# Patient Record
Sex: FEMALE | Race: WHITE | Employment: FULL TIME | URBAN - METROPOLITAN AREA
[De-identification: names, ages, dates, MRNs, and addresses within clinical notes are randomized per-mention and may not be internally consistent; named-entity substitution may affect disease eponyms.]

---

## 2017-04-07 LAB
HBA1C MFR BLD: 5.4 %
TESTOSTERONE TOTAL: NORMAL
VITAMIN B-12: NORMAL

## 2017-04-26 ENCOUNTER — OFFICE VISIT (OUTPATIENT)
Dept: PRIMARY CARE CLINIC | Age: 47
End: 2017-04-26

## 2017-04-26 VITALS
HEART RATE: 74 BPM | BODY MASS INDEX: 40.02 KG/M2 | WEIGHT: 212 LBS | OXYGEN SATURATION: 98 % | SYSTOLIC BLOOD PRESSURE: 128 MMHG | RESPIRATION RATE: 16 BRPM | TEMPERATURE: 97.5 F | HEIGHT: 61 IN | DIASTOLIC BLOOD PRESSURE: 80 MMHG

## 2017-04-26 DIAGNOSIS — R22.1 NECK SWELLING: ICD-10-CM

## 2017-04-26 DIAGNOSIS — I10 ESSENTIAL HYPERTENSION: Primary | ICD-10-CM

## 2017-04-26 DIAGNOSIS — E28.2 PCOS (POLYCYSTIC OVARIAN SYNDROME): ICD-10-CM

## 2017-04-26 PROCEDURE — 1036F TOBACCO NON-USER: CPT | Performed by: FAMILY MEDICINE

## 2017-04-26 PROCEDURE — G8417 CALC BMI ABV UP PARAM F/U: HCPCS | Performed by: FAMILY MEDICINE

## 2017-04-26 PROCEDURE — 99214 OFFICE O/P EST MOD 30 MIN: CPT | Performed by: FAMILY MEDICINE

## 2017-04-26 PROCEDURE — G8427 DOCREV CUR MEDS BY ELIG CLIN: HCPCS | Performed by: FAMILY MEDICINE

## 2017-04-26 RX ORDER — CHOLECALCIFEROL (VITAMIN D3) 25 MCG
TABLET,CHEWABLE ORAL
COMMUNITY

## 2017-04-26 RX ORDER — LANOLIN ALCOHOL/MO/W.PET/CERES
400 CREAM (GRAM) TOPICAL DAILY
COMMUNITY
End: 2019-01-14

## 2017-04-26 ASSESSMENT — ENCOUNTER SYMPTOMS
EYE REDNESS: 0
EYE PAIN: 0
ORTHOPNEA: 0
CONSTIPATION: 0
NAUSEA: 0
WHEEZING: 0
CHEST TIGHTNESS: 0
DIARRHEA: 0
PHOTOPHOBIA: 0
EYE DISCHARGE: 0
COLOR CHANGE: 0
ABDOMINAL PAIN: 0
BLURRED VISION: 0
CHOKING: 0
STRIDOR: 0
APNEA: 0
SHORTNESS OF BREATH: 0
FACIAL SWELLING: 0

## 2017-05-30 ENCOUNTER — TELEPHONE (OUTPATIENT)
Dept: PRIMARY CARE CLINIC | Age: 47
End: 2017-05-30

## 2017-06-02 ENCOUNTER — OFFICE VISIT (OUTPATIENT)
Dept: PRIMARY CARE CLINIC | Age: 47
End: 2017-06-02

## 2017-06-02 VITALS
HEIGHT: 61 IN | RESPIRATION RATE: 14 BRPM | SYSTOLIC BLOOD PRESSURE: 138 MMHG | DIASTOLIC BLOOD PRESSURE: 88 MMHG | BODY MASS INDEX: 40.22 KG/M2 | HEART RATE: 72 BPM | WEIGHT: 213 LBS | TEMPERATURE: 98.4 F

## 2017-06-02 DIAGNOSIS — E11.9 TYPE 2 DIABETES MELLITUS WITHOUT COMPLICATION, WITHOUT LONG-TERM CURRENT USE OF INSULIN (HCC): ICD-10-CM

## 2017-06-02 DIAGNOSIS — R22.1 NECK SWELLING: Primary | ICD-10-CM

## 2017-06-02 DIAGNOSIS — R22.1 NECK SWELLING: ICD-10-CM

## 2017-06-02 DIAGNOSIS — E28.2 PCOS (POLYCYSTIC OVARIAN SYNDROME): ICD-10-CM

## 2017-06-02 LAB
CREATININE URINE: 24.7 MG/DL
MICROALBUMIN UR-MCNC: 1.3 MG/DL
MICROALBUMIN/CREAT UR-RTO: 52.6 MG/G (ref 0–30)
T4 FREE: 1.13 NG/DL (ref 0.93–1.7)
T4 TOTAL: 6.9 UG/DL (ref 4.5–11.7)
TSH SERPL DL<=0.05 MIU/L-ACNC: 1.45 UIU/ML (ref 0.27–4.2)

## 2017-06-02 PROCEDURE — G8427 DOCREV CUR MEDS BY ELIG CLIN: HCPCS | Performed by: FAMILY MEDICINE

## 2017-06-02 PROCEDURE — 1036F TOBACCO NON-USER: CPT | Performed by: FAMILY MEDICINE

## 2017-06-02 PROCEDURE — 3046F HEMOGLOBIN A1C LEVEL >9.0%: CPT | Performed by: FAMILY MEDICINE

## 2017-06-02 PROCEDURE — 99213 OFFICE O/P EST LOW 20 MIN: CPT | Performed by: FAMILY MEDICINE

## 2017-06-02 PROCEDURE — G8417 CALC BMI ABV UP PARAM F/U: HCPCS | Performed by: FAMILY MEDICINE

## 2017-06-02 ASSESSMENT — ENCOUNTER SYMPTOMS
STRIDOR: 0
PHOTOPHOBIA: 0
FACIAL SWELLING: 0
DIARRHEA: 0
ABDOMINAL PAIN: 0
EYE REDNESS: 0
NAUSEA: 0
EYE DISCHARGE: 0
COLOR CHANGE: 0
CONSTIPATION: 0
WHEEZING: 0
CHOKING: 0
CHEST TIGHTNESS: 0
EYE PAIN: 0
APNEA: 0

## 2017-06-02 ASSESSMENT — PATIENT HEALTH QUESTIONNAIRE - PHQ9
SUM OF ALL RESPONSES TO PHQ QUESTIONS 1-9: 0
2. FEELING DOWN, DEPRESSED OR HOPELESS: 0
SUM OF ALL RESPONSES TO PHQ9 QUESTIONS 1 & 2: 0
1. LITTLE INTEREST OR PLEASURE IN DOING THINGS: 0

## 2017-06-24 DIAGNOSIS — J02.9 SORE THROAT: ICD-10-CM

## 2017-06-27 LAB — THROAT CULTURE: NORMAL

## 2017-09-28 RX ORDER — LISINOPRIL 20 MG/1
TABLET ORAL
Qty: 90 TABLET | Refills: 3 | Status: SHIPPED | OUTPATIENT
Start: 2017-09-28 | End: 2019-01-14 | Stop reason: SDUPTHER

## 2017-09-28 RX ORDER — AMLODIPINE BESYLATE 10 MG/1
TABLET ORAL
Qty: 90 TABLET | Refills: 3 | Status: SHIPPED | OUTPATIENT
Start: 2017-09-28 | End: 2019-01-14 | Stop reason: SDUPTHER

## 2017-12-07 ENCOUNTER — OFFICE VISIT (OUTPATIENT)
Dept: PRIMARY CARE CLINIC | Age: 47
End: 2017-12-07

## 2017-12-07 VITALS
TEMPERATURE: 98.3 F | SYSTOLIC BLOOD PRESSURE: 144 MMHG | RESPIRATION RATE: 14 BRPM | HEIGHT: 61 IN | BODY MASS INDEX: 41.72 KG/M2 | DIASTOLIC BLOOD PRESSURE: 88 MMHG | HEART RATE: 76 BPM | WEIGHT: 221 LBS

## 2017-12-07 DIAGNOSIS — R39.9 UTI SYMPTOMS: Primary | ICD-10-CM

## 2017-12-07 DIAGNOSIS — B35.4 TINEA CORPORIS: ICD-10-CM

## 2017-12-07 DIAGNOSIS — R10.9 BILATERAL FLANK PAIN: ICD-10-CM

## 2017-12-07 LAB
BILIRUBIN, POC: NORMAL
BLOOD URINE, POC: NORMAL
CLARITY, POC: CLEAR
COLOR, POC: NORMAL
GLUCOSE URINE, POC: NORMAL
KETONES, POC: NORMAL
LEUKOCYTE EST, POC: NORMAL
NITRITE, POC: NORMAL
PH, POC: 6
PROTEIN, POC: NORMAL
SPECIFIC GRAVITY, POC: 1.01
UROBILINOGEN, POC: 3.5

## 2017-12-07 PROCEDURE — G8417 CALC BMI ABV UP PARAM F/U: HCPCS | Performed by: FAMILY MEDICINE

## 2017-12-07 PROCEDURE — 81002 URINALYSIS NONAUTO W/O SCOPE: CPT | Performed by: FAMILY MEDICINE

## 2017-12-07 PROCEDURE — 99214 OFFICE O/P EST MOD 30 MIN: CPT | Performed by: FAMILY MEDICINE

## 2017-12-07 PROCEDURE — G8427 DOCREV CUR MEDS BY ELIG CLIN: HCPCS | Performed by: FAMILY MEDICINE

## 2017-12-07 PROCEDURE — 1036F TOBACCO NON-USER: CPT | Performed by: FAMILY MEDICINE

## 2017-12-07 PROCEDURE — 90715 TDAP VACCINE 7 YRS/> IM: CPT | Performed by: FAMILY MEDICINE

## 2017-12-07 PROCEDURE — 90471 IMMUNIZATION ADMIN: CPT | Performed by: FAMILY MEDICINE

## 2017-12-07 PROCEDURE — G8484 FLU IMMUNIZE NO ADMIN: HCPCS | Performed by: FAMILY MEDICINE

## 2017-12-07 RX ORDER — PREDNISONE 10 MG/1
TABLET ORAL
Qty: 20 TABLET | Refills: 0 | Status: SHIPPED | OUTPATIENT
Start: 2017-12-07 | End: 2017-12-17

## 2017-12-07 RX ORDER — METHOCARBAMOL 500 MG/1
500 TABLET, FILM COATED ORAL EVERY EVENING
Qty: 10 TABLET | Refills: 0 | Status: SHIPPED | OUTPATIENT
Start: 2017-12-07 | End: 2017-12-17

## 2017-12-07 ASSESSMENT — ENCOUNTER SYMPTOMS
EYES NEGATIVE: 1
DIARRHEA: 0
PHOTOPHOBIA: 0
EYE REDNESS: 0
RESPIRATORY NEGATIVE: 1
ABDOMINAL PAIN: 0
CONSTIPATION: 0
SHORTNESS OF BREATH: 0
GASTROINTESTINAL NEGATIVE: 1
EYE ITCHING: 0
BOWEL INCONTINENCE: 0
BACK PAIN: 1
WHEEZING: 0

## 2017-12-07 NOTE — PROGRESS NOTES
on file. Social History     Social History    Marital status:      Spouse name: N/A    Number of children: N/A    Years of education: N/A     Occupational History    Not on file. Social History Main Topics    Smoking status: Former Smoker     Packs/day: 0.80     Types: Cigarettes     Quit date: 1/5/2013    Smokeless tobacco: Never Used    Alcohol use No    Drug use: No    Sexual activity: Not on file     Other Topics Concern    Not on file     Social History Narrative    No narrative on file     Allergies:  Review of patient's allergies indicates no known allergies. Review of Systems   Constitutional: Negative. Negative for activity change, appetite change, fatigue, fever and weight loss. HENT: Negative. Eyes: Negative. Negative for photophobia, redness and itching. Respiratory: Negative. Negative for shortness of breath and wheezing. Cardiovascular: Negative. Negative for chest pain. Gastrointestinal: Negative. Negative for abdominal pain, bowel incontinence, constipation and diarrhea. Genitourinary: Positive for flank pain. Negative for bladder incontinence, decreased urine volume, difficulty urinating, dyspareunia, dysuria, enuresis, frequency, hematuria, pelvic pain and urgency. Musculoskeletal: Positive for back pain. Skin: Positive for rash (left axilla). Neurological: Negative. Negative for tingling, weakness, numbness, headaches and paresthesias. Psychiatric/Behavioral: Negative. Negative for agitation and behavioral problems. The patient is not nervous/anxious. Objective:   BP (!) 144/88 (Site: Right Arm, Position: Sitting, Cuff Size: Medium Adult)   Pulse 76   Temp 98.3 °F (36.8 °C) (Oral)   Resp 14   Ht 5' 1\" (1.549 m)   Wt 221 lb (100.2 kg)   LMP 11/14/2017 (Exact Date)   BMI 41.76 kg/m²     Physical Exam   Constitutional: She is oriented to person, place, and time. She appears well-developed and well-nourished.    HENT:   Head: Dispense:  10 tablet     Refill:  0    econazole nitrate 1 % cream     Sig: Apply topically 2 times daily Apply topically daily. Dispense:  85 g     Refill:  3       Controlled Substances Monitoring:      No Follow-up on file. I, Yolande Mark CMA   , am scribing for and in the presence of [employer], DO. Electronically signed by :  Yolande Gao Chi, DO, personally performed the services described in this documentation, as scribed by Tomasa Henderson CMA   in my presence, and it is both accurate and complete.  Electronically signed by: [place] RoomActuallyDO    12/7/17 3:33 PM    Pilo Ruiz DO

## 2017-12-26 ENCOUNTER — OFFICE VISIT (OUTPATIENT)
Dept: PRIMARY CARE CLINIC | Age: 47
End: 2017-12-26

## 2017-12-26 VITALS
TEMPERATURE: 98.5 F | WEIGHT: 223 LBS | DIASTOLIC BLOOD PRESSURE: 76 MMHG | BODY MASS INDEX: 42.1 KG/M2 | HEART RATE: 88 BPM | RESPIRATION RATE: 14 BRPM | HEIGHT: 61 IN | SYSTOLIC BLOOD PRESSURE: 134 MMHG

## 2017-12-26 DIAGNOSIS — N28.1 RENAL CYST: Primary | ICD-10-CM

## 2017-12-26 PROCEDURE — 1036F TOBACCO NON-USER: CPT | Performed by: FAMILY MEDICINE

## 2017-12-26 PROCEDURE — 99213 OFFICE O/P EST LOW 20 MIN: CPT | Performed by: FAMILY MEDICINE

## 2017-12-26 PROCEDURE — G8427 DOCREV CUR MEDS BY ELIG CLIN: HCPCS | Performed by: FAMILY MEDICINE

## 2017-12-26 PROCEDURE — G8417 CALC BMI ABV UP PARAM F/U: HCPCS | Performed by: FAMILY MEDICINE

## 2017-12-26 PROCEDURE — G8484 FLU IMMUNIZE NO ADMIN: HCPCS | Performed by: FAMILY MEDICINE

## 2017-12-26 ASSESSMENT — ENCOUNTER SYMPTOMS
FACIAL SWELLING: 0
PHOTOPHOBIA: 0
EYE DISCHARGE: 0
CHEST TIGHTNESS: 0
EYE REDNESS: 0
DIARRHEA: 0
WHEEZING: 0
CHOKING: 0
VOMITING: 0
EYE PAIN: 0
CONSTIPATION: 0
APNEA: 0
ABDOMINAL PAIN: 0
COLOR CHANGE: 0
STRIDOR: 0
NAUSEA: 0

## 2017-12-26 NOTE — PROGRESS NOTES
Subjective:      Patient ID: Iza Dominguez is a 50 y.o. female who presents today for:  Chief Complaint   Patient presents with    Results     Pt is here for a review of sonagram results 2017 for urinary problems. Pt states the UTI symptoms are resolved and feeling good. Urinary Tract Infection    This is a new problem. The current episode started 1 to 4 weeks ago. The problem has been resolved. The pain is at a severity of 0/10. The patient is experiencing no pain. There has been no fever. There is no history of pyelonephritis. Pertinent negatives include no chills, discharge, flank pain, frequency, hematuria, hesitancy, nausea, possible pregnancy, sweats, urgency or vomiting. Treatments tried: Prednisone, muscle relaxers. The treatment provided significant relief. There is no history of catheterization, kidney stones, recurrent UTIs, a single kidney, urinary stasis or a urological procedure. Results of Retroperitoneal US were discussed. Past Medical History:   Diagnosis Date    Basal cell carcinoma, Mohs, Jakob, Nose 2013    Bronchitis 2013    DM (diabetes mellitus) ([facility] [address] 2013    ETD (eustachian tube dysfunction) 2015    Fatigue 2013    Fatty liver     [place] nodes 2013    Hyperlipidemia     Hypertension     OA (osteoarthritis) 2013    Obesity 2013    PCOS (polycystic ovarian syndrome) 3/2/2015    RAD (reactive airway disease) 2013    Renal cyst 2017    Tinea corporis 2016    Tobacco abuse, quit 2013    Unspecified sleep apnea     Vitamin D deficiency 3/2/2015     Past Surgical History:   Procedure Laterality Date     SECTION      x 2    TONSILLECTOMY       No family history on file. Social History     Social History    Marital status:      Spouse name: N/A    Number of children: N/A    Years of education: N/A     Occupational History    Not on file.      Social History Main Topics    Smoking status: Former Smoker     Packs/day: 0.80     Types: Cigarettes     Quit date: 1/5/2013    Smokeless tobacco: Never Used    Alcohol use No    Drug use: No    Sexual activity: Not on file     Other Topics Concern    Not on file     Social History Narrative    No narrative on file     Allergies:  Review of patient's allergies indicates no known allergies. Review of Systems   Constitutional: Negative for activity change, appetite change, chills, diaphoresis and fever. HENT: Negative for congestion, ear discharge, ear pain, facial swelling, hearing loss and mouth sores. Eyes: Negative for photophobia, pain, discharge and redness. Respiratory: Negative for apnea, choking, chest tightness, wheezing and stridor. Cardiovascular: Negative for chest pain, palpitations and leg swelling. Gastrointestinal: Negative for abdominal pain, constipation, diarrhea, nausea and vomiting. Endocrine: Negative for cold intolerance, heat intolerance, polydipsia and polyphagia. Genitourinary: Negative for dysuria, flank pain, frequency, hematuria, hesitancy, pelvic pain and urgency. Musculoskeletal: Negative for gait problem, joint swelling, neck pain and neck stiffness. Skin: Negative for color change, pallor, rash and wound. Allergic/Immunologic: Negative for immunocompromised state. Neurological: Negative for tremors, syncope, facial asymmetry, speech difficulty and headaches. Psychiatric/Behavioral: Negative for confusion and hallucinations. The patient is not nervous/anxious and is not hyperactive. Objective:   /76 (Site: Left Arm, Position: Sitting, Cuff Size: Large Adult)   Pulse 88   Temp 98.5 °F (36.9 °C) (Oral)   Resp 14   Ht 5' 1\" (1.549 m)   Wt 223 lb (101.2 kg)   LMP 12/06/2017 (Approximate)   BMI 42.14 kg/m²     Physical Exam   Constitutional: She is oriented to person, place, and time. She appears well-developed and well-nourished.    HENT:   Head: Normocephalic and

## 2018-03-15 DIAGNOSIS — R10.9 BILATERAL FLANK PAIN: ICD-10-CM

## 2018-09-23 RX ORDER — AMLODIPINE BESYLATE 10 MG/1
TABLET ORAL
Qty: 90 TABLET | Refills: 3 | OUTPATIENT
Start: 2018-09-23

## 2018-09-23 RX ORDER — LISINOPRIL 20 MG/1
TABLET ORAL
Qty: 90 TABLET | Refills: 3 | OUTPATIENT
Start: 2018-09-23

## 2018-12-05 ENCOUNTER — TELEPHONE (OUTPATIENT)
Dept: PRIMARY CARE CLINIC | Age: 48
End: 2018-12-05

## 2019-01-08 ENCOUNTER — OFFICE VISIT (OUTPATIENT)
Dept: PRIMARY CARE CLINIC | Age: 49
End: 2019-01-08
Payer: COMMERCIAL

## 2019-01-08 VITALS
OXYGEN SATURATION: 98 % | DIASTOLIC BLOOD PRESSURE: 82 MMHG | TEMPERATURE: 97.6 F | SYSTOLIC BLOOD PRESSURE: 120 MMHG | WEIGHT: 223 LBS | HEIGHT: 61 IN | BODY MASS INDEX: 42.1 KG/M2 | RESPIRATION RATE: 16 BRPM | HEART RATE: 76 BPM

## 2019-01-08 DIAGNOSIS — J40 BRONCHITIS: Primary | ICD-10-CM

## 2019-01-08 DIAGNOSIS — R05.9 COUGH: ICD-10-CM

## 2019-01-08 PROCEDURE — G8427 DOCREV CUR MEDS BY ELIG CLIN: HCPCS | Performed by: NURSE PRACTITIONER

## 2019-01-08 PROCEDURE — 3017F COLORECTAL CA SCREEN DOC REV: CPT | Performed by: NURSE PRACTITIONER

## 2019-01-08 PROCEDURE — G8484 FLU IMMUNIZE NO ADMIN: HCPCS | Performed by: NURSE PRACTITIONER

## 2019-01-08 PROCEDURE — 99213 OFFICE O/P EST LOW 20 MIN: CPT | Performed by: NURSE PRACTITIONER

## 2019-01-08 PROCEDURE — G8417 CALC BMI ABV UP PARAM F/U: HCPCS | Performed by: NURSE PRACTITIONER

## 2019-01-08 PROCEDURE — 1036F TOBACCO NON-USER: CPT | Performed by: NURSE PRACTITIONER

## 2019-01-08 RX ORDER — PREDNISONE 10 MG/1
TABLET ORAL
Qty: 18 TABLET | Refills: 0 | Status: SHIPPED | OUTPATIENT
Start: 2019-01-08 | End: 2019-01-17

## 2019-01-08 RX ORDER — AZITHROMYCIN 250 MG/1
TABLET, FILM COATED ORAL
Qty: 1 PACKET | Refills: 0 | Status: SHIPPED | OUTPATIENT
Start: 2019-01-08 | End: 2019-01-14

## 2019-01-08 ASSESSMENT — ENCOUNTER SYMPTOMS
WHEEZING: 1
SORE THROAT: 0
COUGH: 1
SHORTNESS OF BREATH: 1
RHINORRHEA: 0

## 2019-01-08 ASSESSMENT — PATIENT HEALTH QUESTIONNAIRE - PHQ9: DEPRESSION UNABLE TO ASSESS: PT REFUSES

## 2019-01-14 ENCOUNTER — OFFICE VISIT (OUTPATIENT)
Dept: PRIMARY CARE CLINIC | Age: 49
End: 2019-01-14
Payer: COMMERCIAL

## 2019-01-14 VITALS
BODY MASS INDEX: 40.59 KG/M2 | HEART RATE: 69 BPM | HEIGHT: 61 IN | RESPIRATION RATE: 18 BRPM | DIASTOLIC BLOOD PRESSURE: 82 MMHG | WEIGHT: 215 LBS | SYSTOLIC BLOOD PRESSURE: 128 MMHG | TEMPERATURE: 97.6 F | OXYGEN SATURATION: 98 %

## 2019-01-14 DIAGNOSIS — Z12.11 COLON CANCER SCREENING: ICD-10-CM

## 2019-01-14 DIAGNOSIS — Z12.39 BREAST CANCER SCREENING: ICD-10-CM

## 2019-01-14 DIAGNOSIS — I10 ESSENTIAL HYPERTENSION: Primary | ICD-10-CM

## 2019-01-14 DIAGNOSIS — J40 BRONCHITIS: ICD-10-CM

## 2019-01-14 DIAGNOSIS — E11.9 TYPE 2 DIABETES MELLITUS WITHOUT COMPLICATION, WITHOUT LONG-TERM CURRENT USE OF INSULIN (HCC): ICD-10-CM

## 2019-01-14 LAB — HBA1C MFR BLD: 4.5 %

## 2019-01-14 PROCEDURE — G8484 FLU IMMUNIZE NO ADMIN: HCPCS | Performed by: FAMILY MEDICINE

## 2019-01-14 PROCEDURE — 3044F HG A1C LEVEL LT 7.0%: CPT | Performed by: FAMILY MEDICINE

## 2019-01-14 PROCEDURE — 3017F COLORECTAL CA SCREEN DOC REV: CPT | Performed by: FAMILY MEDICINE

## 2019-01-14 PROCEDURE — G8417 CALC BMI ABV UP PARAM F/U: HCPCS | Performed by: FAMILY MEDICINE

## 2019-01-14 PROCEDURE — 99214 OFFICE O/P EST MOD 30 MIN: CPT | Performed by: FAMILY MEDICINE

## 2019-01-14 PROCEDURE — G8427 DOCREV CUR MEDS BY ELIG CLIN: HCPCS | Performed by: FAMILY MEDICINE

## 2019-01-14 PROCEDURE — 2022F DILAT RTA XM EVC RTNOPTHY: CPT | Performed by: FAMILY MEDICINE

## 2019-01-14 PROCEDURE — 83036 HEMOGLOBIN GLYCOSYLATED A1C: CPT | Performed by: FAMILY MEDICINE

## 2019-01-14 PROCEDURE — 1036F TOBACCO NON-USER: CPT | Performed by: FAMILY MEDICINE

## 2019-01-14 RX ORDER — AMLODIPINE BESYLATE 10 MG/1
TABLET ORAL
Qty: 90 TABLET | Refills: 3 | Status: SHIPPED | OUTPATIENT
Start: 2019-01-14

## 2019-01-14 RX ORDER — PROMETHAZINE HYDROCHLORIDE AND CODEINE PHOSPHATE 6.25; 1 MG/5ML; MG/5ML
5 SYRUP ORAL 4 TIMES DAILY PRN
Qty: 118 ML | Refills: 0 | Status: SHIPPED | OUTPATIENT
Start: 2019-01-14 | End: 2019-01-21

## 2019-01-14 RX ORDER — LISINOPRIL 20 MG/1
TABLET ORAL
Qty: 90 TABLET | Refills: 3 | Status: SHIPPED | OUTPATIENT
Start: 2019-01-14

## 2019-01-14 RX ORDER — PROMETHAZINE HYDROCHLORIDE AND CODEINE PHOSPHATE 6.25; 1 MG/5ML; MG/5ML
5 SYRUP ORAL 4 TIMES DAILY PRN
Qty: 120 ML | Refills: 1 | Status: SHIPPED | OUTPATIENT
Start: 2019-01-14 | End: 2019-01-14

## 2019-01-14 ASSESSMENT — ENCOUNTER SYMPTOMS
PHOTOPHOBIA: 0
SHORTNESS OF BREATH: 0
EYE PAIN: 0
WHEEZING: 0
ORTHOPNEA: 0
BLURRED VISION: 0
NAUSEA: 0
CHOKING: 1
COLOR CHANGE: 0
STRIDOR: 0
FACIAL SWELLING: 0
ABDOMINAL PAIN: 0
APNEA: 0
CHEST TIGHTNESS: 1
DIARRHEA: 0
EYE REDNESS: 0
CONSTIPATION: 0
EYE DISCHARGE: 0

## 2019-01-14 ASSESSMENT — PATIENT HEALTH QUESTIONNAIRE - PHQ9
2. FEELING DOWN, DEPRESSED OR HOPELESS: 0
SUM OF ALL RESPONSES TO PHQ9 QUESTIONS 1 & 2: 0
1. LITTLE INTEREST OR PLEASURE IN DOING THINGS: 0
SUM OF ALL RESPONSES TO PHQ QUESTIONS 1-9: 0
SUM OF ALL RESPONSES TO PHQ QUESTIONS 1-9: 0

## 2019-01-31 ENCOUNTER — OFFICE VISIT (OUTPATIENT)
Dept: PRIMARY CARE CLINIC | Age: 49
End: 2019-01-31
Payer: COMMERCIAL

## 2019-01-31 VITALS
HEIGHT: 61 IN | SYSTOLIC BLOOD PRESSURE: 122 MMHG | HEART RATE: 86 BPM | WEIGHT: 218 LBS | TEMPERATURE: 97.6 F | RESPIRATION RATE: 16 BRPM | OXYGEN SATURATION: 98 % | DIASTOLIC BLOOD PRESSURE: 82 MMHG | BODY MASS INDEX: 41.16 KG/M2

## 2019-01-31 DIAGNOSIS — J01.00 ACUTE NON-RECURRENT MAXILLARY SINUSITIS: Primary | ICD-10-CM

## 2019-01-31 DIAGNOSIS — R09.82 PND (POST-NASAL DRIP): ICD-10-CM

## 2019-01-31 PROCEDURE — 1036F TOBACCO NON-USER: CPT | Performed by: PHYSICIAN ASSISTANT

## 2019-01-31 PROCEDURE — G8427 DOCREV CUR MEDS BY ELIG CLIN: HCPCS | Performed by: PHYSICIAN ASSISTANT

## 2019-01-31 PROCEDURE — 3017F COLORECTAL CA SCREEN DOC REV: CPT | Performed by: PHYSICIAN ASSISTANT

## 2019-01-31 PROCEDURE — 99213 OFFICE O/P EST LOW 20 MIN: CPT | Performed by: PHYSICIAN ASSISTANT

## 2019-01-31 PROCEDURE — G8484 FLU IMMUNIZE NO ADMIN: HCPCS | Performed by: PHYSICIAN ASSISTANT

## 2019-01-31 PROCEDURE — G8417 CALC BMI ABV UP PARAM F/U: HCPCS | Performed by: PHYSICIAN ASSISTANT

## 2019-01-31 RX ORDER — FLUTICASONE PROPIONATE 50 MCG
2 SPRAY, SUSPENSION (ML) NASAL NIGHTLY
Qty: 1 BOTTLE | Refills: 0 | Status: SHIPPED | OUTPATIENT
Start: 2019-01-31

## 2019-01-31 RX ORDER — DEXTROMETHORPHAN HYDROBROMIDE AND PROMETHAZINE HYDROCHLORIDE 15; 6.25 MG/5ML; MG/5ML
5 SYRUP ORAL 4 TIMES DAILY PRN
Qty: 200 ML | Refills: 0 | Status: CANCELLED | OUTPATIENT
Start: 2019-01-31

## 2019-01-31 RX ORDER — CEFDINIR 300 MG/1
300 CAPSULE ORAL 2 TIMES DAILY
Qty: 20 CAPSULE | Refills: 0 | Status: SHIPPED | OUTPATIENT
Start: 2019-01-31 | End: 2019-02-10

## 2019-01-31 ASSESSMENT — ENCOUNTER SYMPTOMS
SORE THROAT: 1
SWOLLEN GLANDS: 0
SINUS PRESSURE: 1
HOARSE VOICE: 0
SHORTNESS OF BREATH: 0
COUGH: 1

## 2019-02-08 ENCOUNTER — OFFICE VISIT (OUTPATIENT)
Dept: PRIMARY CARE CLINIC | Age: 49
End: 2019-02-08
Payer: COMMERCIAL

## 2019-02-08 VITALS
DIASTOLIC BLOOD PRESSURE: 86 MMHG | WEIGHT: 217.3 LBS | HEIGHT: 61 IN | OXYGEN SATURATION: 98 % | HEART RATE: 80 BPM | TEMPERATURE: 98 F | BODY MASS INDEX: 41.03 KG/M2 | SYSTOLIC BLOOD PRESSURE: 132 MMHG | RESPIRATION RATE: 14 BRPM

## 2019-02-08 DIAGNOSIS — B37.0 THRUSH: ICD-10-CM

## 2019-02-08 DIAGNOSIS — J06.9 UPPER RESPIRATORY TRACT INFECTION, UNSPECIFIED TYPE: ICD-10-CM

## 2019-02-08 DIAGNOSIS — J06.9 UPPER RESPIRATORY TRACT INFECTION, UNSPECIFIED TYPE: Primary | ICD-10-CM

## 2019-02-08 DIAGNOSIS — J02.9 ACUTE PHARYNGITIS, UNSPECIFIED ETIOLOGY: ICD-10-CM

## 2019-02-08 PROCEDURE — 99213 OFFICE O/P EST LOW 20 MIN: CPT | Performed by: FAMILY MEDICINE

## 2019-02-08 PROCEDURE — 1036F TOBACCO NON-USER: CPT | Performed by: FAMILY MEDICINE

## 2019-02-08 PROCEDURE — G8427 DOCREV CUR MEDS BY ELIG CLIN: HCPCS | Performed by: FAMILY MEDICINE

## 2019-02-08 PROCEDURE — G8417 CALC BMI ABV UP PARAM F/U: HCPCS | Performed by: FAMILY MEDICINE

## 2019-02-08 PROCEDURE — G8484 FLU IMMUNIZE NO ADMIN: HCPCS | Performed by: FAMILY MEDICINE

## 2019-02-08 PROCEDURE — 3017F COLORECTAL CA SCREEN DOC REV: CPT | Performed by: FAMILY MEDICINE

## 2019-02-08 RX ORDER — FLUCONAZOLE 100 MG/1
100 TABLET ORAL DAILY
Qty: 7 TABLET | Refills: 0 | Status: SHIPPED | OUTPATIENT
Start: 2019-02-08 | End: 2019-02-15

## 2019-02-08 ASSESSMENT — ENCOUNTER SYMPTOMS
SORE THROAT: 1
SHORTNESS OF BREATH: 0
PHOTOPHOBIA: 0
ABDOMINAL PAIN: 0
WHEEZING: 0
RHINORRHEA: 0
SWOLLEN GLANDS: 0
SINUS PAIN: 0
NAUSEA: 0
EYE ITCHING: 0
VOMITING: 0
COUGH: 1
EYE REDNESS: 0
GASTROINTESTINAL NEGATIVE: 1
CONSTIPATION: 0
EYES NEGATIVE: 1
DIARRHEA: 0
BACK PAIN: 0

## 2019-02-10 LAB
REASON FOR REJECTION: NORMAL
REJECTED TEST: NORMAL

## 2019-02-16 ENCOUNTER — HOSPITAL ENCOUNTER (OUTPATIENT)
Dept: WOMENS IMAGING | Age: 49
Discharge: HOME | End: 2019-02-18
Payer: COMMERCIAL

## 2019-02-16 DIAGNOSIS — Z12.39 BREAST CANCER SCREENING: ICD-10-CM

## 2019-02-16 PROCEDURE — 77063 BREAST TOMOSYNTHESIS BI: CPT

## 2019-02-19 ENCOUNTER — TELEPHONE (OUTPATIENT)
Dept: PRIMARY CARE CLINIC | Age: 49
End: 2019-02-19

## 2019-02-19 DIAGNOSIS — R19.5 POSITIVE COLORECTAL CANCER SCREENING USING COLOGUARD TEST: Primary | ICD-10-CM

## 2019-02-19 DIAGNOSIS — Z12.11 COLON CANCER SCREENING: ICD-10-CM

## 2019-02-28 DIAGNOSIS — Z12.11 SCREENING FOR COLON CANCER: Primary | ICD-10-CM

## 2019-03-15 ENCOUNTER — TELEPHONE (OUTPATIENT)
Dept: PRIMARY CARE CLINIC | Age: 49
End: 2019-03-15

## 2022-01-08 ENCOUNTER — NURSE TRIAGE (OUTPATIENT)
Dept: OTHER | Facility: CLINIC | Age: 52
End: 2022-01-08

## 2022-01-09 NOTE — TELEPHONE ENCOUNTER
Subjective: Caller states she was diagnosed with covid and covid pneumonia 2 weeks ago and was hospitalized due to Afib and RVR. Tonight patient randomly checked her pulse ox and noticed that her heart rate has been jumping around from low 100's to up in the 120's and has sustained that since checking it 10 minutes ago. Patient verbalized being able to feel skipped beats. Denies feeling short of breath or dizzy. Current Symptoms: tachycardia and feeling skipped beats    Onset: 10 minutes ago     Pain Severity: 0/10; Temperature: Denies fever     What has been tried: She is on medication for afib    LMP: NA Pregnant: NA    Recommended disposition: see provider within 4 hours    Care advice provided, patient verbalizes understanding; denies any other questions or concerns; instructed to call back for any new or worsening symptoms. Patient/caller proceeding to nearest Emergency Department      This triage is a result of a call to [address] [place] [address]. Please do not respond to the triage nurse through this encounter. Any subsequent communication should be directly with the patient.     Reason for Disposition   [1] Skipped or extra beat(s) AND [2] increases with exercise or exertion    Protocols used: HEART RATE AND HEARTBEAT QUESTIONS-ADULT-AH

## 2022-01-24 ENCOUNTER — NURSE TRIAGE (OUTPATIENT)
Dept: OTHER | Facility: CLINIC | Age: 52
End: 2022-01-24

## 2022-01-24 NOTE — TELEPHONE ENCOUNTER
Subjective: Caller states \"I didn't have high blood pressure until after COVID and they changed around my medications a lot. Have had 180/103. \" Pt talked at length about the stress she has been under since the COVID DX, being in the hospital, and the blood pressure problems she's been having. Current Symptoms: has to take a deep breath at times    Onset: 2 days ago; sudden    Pain Severity: none    Temperature: none    What has been tried: BP meds    Recommended disposition: call cardiology specialist tomorrow    Care advice provided, patient verbalizes understanding; denies any other questions or concerns; instructed to call back for any new or worsening symptoms. This triage is a result of a call to [address] [place] [address]. Please do not respond to the triage nurse through this encounter. Any subsequent communication should be directly with the patient.       Reason for Disposition   Systolic BP  >= 340 OR Diastolic >= 629    Protocols used: BLOOD PRESSURE - HIGH-ADULT-

## 2023-03-07 DIAGNOSIS — E11.9 TYPE 2 DIABETES MELLITUS WITHOUT COMPLICATION, WITHOUT LONG-TERM CURRENT USE OF INSULIN (MULTI): ICD-10-CM

## 2023-03-07 DIAGNOSIS — I25.10 CORONARY ARTERY DISEASE, UNSPECIFIED VESSEL OR LESION TYPE, UNSPECIFIED WHETHER ANGINA PRESENT, UNSPECIFIED WHETHER NATIVE OR TRANSPLANTED HEART: ICD-10-CM

## 2023-03-07 DIAGNOSIS — I48.91 ATRIAL FIBRILLATION, UNSPECIFIED TYPE (MULTI): Primary | ICD-10-CM

## 2023-03-10 ENCOUNTER — TRANSCRIBE ORDERS (OUTPATIENT)
Dept: ADMINISTRATIVE | Age: 53
End: 2023-03-10

## 2023-03-10 DIAGNOSIS — Z12.39 ENCOUNTER FOR SCREENING FOR MALIGNANT NEOPLASM OF BREAST, UNSPECIFIED SCREENING MODALITY: Primary | ICD-10-CM

## 2023-03-15 ENCOUNTER — OFFICE VISIT (OUTPATIENT)
Dept: PRIMARY CARE | Facility: CLINIC | Age: 53
End: 2023-03-15
Payer: COMMERCIAL

## 2023-03-15 VITALS
RESPIRATION RATE: 16 BRPM | SYSTOLIC BLOOD PRESSURE: 130 MMHG | HEART RATE: 78 BPM | TEMPERATURE: 97.5 F | OXYGEN SATURATION: 99 % | HEIGHT: 61 IN | BODY MASS INDEX: 45.12 KG/M2 | DIASTOLIC BLOOD PRESSURE: 70 MMHG | WEIGHT: 239 LBS

## 2023-03-15 DIAGNOSIS — S46.911A MUSCLE STRAIN OF RIGHT SCAPULAR REGION, INITIAL ENCOUNTER: Primary | ICD-10-CM

## 2023-03-15 PROCEDURE — 1036F TOBACCO NON-USER: CPT | Performed by: NURSE PRACTITIONER

## 2023-03-15 PROCEDURE — 99214 OFFICE O/P EST MOD 30 MIN: CPT | Performed by: NURSE PRACTITIONER

## 2023-03-15 PROCEDURE — 3075F SYST BP GE 130 - 139MM HG: CPT | Performed by: NURSE PRACTITIONER

## 2023-03-15 PROCEDURE — 3078F DIAST BP <80 MM HG: CPT | Performed by: NURSE PRACTITIONER

## 2023-03-15 PROCEDURE — 3008F BODY MASS INDEX DOCD: CPT | Performed by: NURSE PRACTITIONER

## 2023-03-15 PROCEDURE — 4010F ACE/ARB THERAPY RXD/TAKEN: CPT | Performed by: NURSE PRACTITIONER

## 2023-03-15 RX ORDER — ECONAZOLE NITRATE 10 MG/G
1 CREAM TOPICAL 2 TIMES DAILY
COMMUNITY
Start: 2022-04-18 | End: 2023-06-27 | Stop reason: SDUPTHER

## 2023-03-15 RX ORDER — NITROGLYCERIN 0.4 MG/1
0.4 TABLET SUBLINGUAL EVERY 5 MIN PRN
COMMUNITY
End: 2024-03-04 | Stop reason: SDUPTHER

## 2023-03-15 RX ORDER — CYCLOBENZAPRINE HCL 5 MG
5 TABLET ORAL NIGHTLY
Qty: 10 TABLET | Refills: 0 | Status: SHIPPED | OUTPATIENT
Start: 2023-03-15 | End: 2023-03-25

## 2023-03-15 RX ORDER — BIOTIN 1 MG
TABLET ORAL
COMMUNITY
End: 2023-03-16 | Stop reason: SDUPTHER

## 2023-03-15 RX ORDER — TRIAMCINOLONE ACETONIDE 40 MG/ML
60 INJECTION, SUSPENSION INTRA-ARTICULAR; INTRAMUSCULAR ONCE
Status: COMPLETED | OUTPATIENT
Start: 2023-03-15 | End: 2023-05-04

## 2023-03-15 RX ORDER — ACETAMINOPHEN 500 MG
1 TABLET ORAL DAILY
COMMUNITY
End: 2023-03-16 | Stop reason: SDUPTHER

## 2023-03-15 RX ORDER — LISINOPRIL 20 MG/1
40 TABLET ORAL 2 TIMES DAILY
COMMUNITY
End: 2024-01-10 | Stop reason: WASHOUT

## 2023-03-15 RX ORDER — CYCLOBENZAPRINE HCL 5 MG
5 TABLET ORAL NIGHTLY PRN
Qty: 30 TABLET | Refills: 2 | Status: SHIPPED | OUTPATIENT
Start: 2023-03-15 | End: 2023-03-15 | Stop reason: SDUPTHER

## 2023-03-15 RX ORDER — CLOPIDOGREL BISULFATE 75 MG/1
75 TABLET ORAL DAILY
COMMUNITY
End: 2024-01-06

## 2023-03-15 RX ORDER — METOPROLOL TARTRATE 50 MG/1
1.5 TABLET ORAL 2 TIMES DAILY
COMMUNITY
End: 2023-06-23 | Stop reason: WASHOUT

## 2023-03-15 RX ORDER — ELECTROLYTES/DEXTROSE
5 SOLUTION, ORAL ORAL
COMMUNITY

## 2023-03-15 RX ORDER — DOFETILIDE 0.12 MG/1
125 CAPSULE ORAL EVERY 12 HOURS
COMMUNITY
End: 2023-08-25 | Stop reason: SDUPTHER

## 2023-03-15 RX ORDER — FLUTICASONE PROPIONATE 50 MCG
1 SPRAY, SUSPENSION (ML) NASAL DAILY
COMMUNITY
Start: 2020-08-06

## 2023-03-15 RX ORDER — ATORVASTATIN CALCIUM 40 MG/1
40 TABLET, FILM COATED ORAL DAILY
COMMUNITY
End: 2023-03-30 | Stop reason: SDUPTHER

## 2023-03-15 RX ORDER — IBUPROFEN 600 MG/1
600 TABLET ORAL 2 TIMES DAILY
Qty: 20 TABLET | Refills: 0 | Status: SHIPPED | OUTPATIENT
Start: 2023-03-15 | End: 2023-03-25

## 2023-03-15 ASSESSMENT — ENCOUNTER SYMPTOMS
ACTIVITY CHANGE: 0
EYE DISCHARGE: 0
BACK PAIN: 1

## 2023-03-15 NOTE — PROGRESS NOTES
Subjective   Patient ID: Turner Thakkar is a 53 y.o. female who presents for Back Pain (PT states she has pain below right shoulder blade for three days now. ).    HPI No injury to backBack Pain (PT states she has pain below right shoulder blade for three days now. )  Pt reports certain movements can feel it more. Pt reports with deep breathing can feel the back pain. Pt describes the pain as sharp. Pt reports it stays in the right should blade. Pt has use heating pad and icy hot which works some.     Review of Systems   Constitutional:  Negative for activity change.   Eyes:  Negative for discharge.   Musculoskeletal:  Positive for back pain.       Objective   There were no vitals taken for this visit.    Physical Exam  Constitutional:       Appearance: Normal appearance.   Cardiovascular:      Rate and Rhythm: Normal rate and regular rhythm.   Pulmonary:      Effort: No respiratory distress.   Musculoskeletal:         General: Tenderness present.      Comments: Right scapular pain point tenderness present    Neurological:      Mental Status: She is alert.         Assessment/Plan

## 2023-03-15 NOTE — PATIENT INSTRUCTIONS
Patient presents today for right scapular pain limited ROM     Right scapular pain- concerns of strain   Kenalog 60mg today in office   Avoid oral steroids 2/2 to diabetes   Flexeril muscle relaxant 5mg short course at night     If not improving limited use of anti-inflammatory 2/2 diabetes   .

## 2023-03-16 ENCOUNTER — TELEMEDICINE (OUTPATIENT)
Dept: PHARMACY | Facility: HOSPITAL | Age: 53
End: 2023-03-16
Payer: COMMERCIAL

## 2023-03-16 DIAGNOSIS — E11.9 TYPE 2 DIABETES MELLITUS WITHOUT COMPLICATION, WITHOUT LONG-TERM CURRENT USE OF INSULIN (MULTI): ICD-10-CM

## 2023-03-16 DIAGNOSIS — I25.10 CORONARY ARTERY DISEASE, UNSPECIFIED VESSEL OR LESION TYPE, UNSPECIFIED WHETHER ANGINA PRESENT, UNSPECIFIED WHETHER NATIVE OR TRANSPLANTED HEART: ICD-10-CM

## 2023-03-16 DIAGNOSIS — I48.91 ATRIAL FIBRILLATION, UNSPECIFIED TYPE (MULTI): ICD-10-CM

## 2023-03-16 PROBLEM — J02.9 SORE THROAT: Status: ACTIVE | Noted: 2023-03-16

## 2023-03-16 PROBLEM — E78.5 DYSLIPIDEMIA: Status: ACTIVE | Noted: 2023-03-16

## 2023-03-16 PROBLEM — H53.121 TRANSIENT VISUAL LOSS OF RIGHT EYE: Status: ACTIVE | Noted: 2023-03-16

## 2023-03-16 PROBLEM — M67.432 GANGLION OF LEFT WRIST: Status: ACTIVE | Noted: 2023-03-16

## 2023-03-16 PROBLEM — R53.83 FATIGUE: Status: ACTIVE | Noted: 2023-03-16

## 2023-03-16 PROBLEM — E66.01 MORBID OBESITY WITH BMI OF 45.0-49.9, ADULT (MULTI): Status: ACTIVE | Noted: 2023-03-16

## 2023-03-16 PROBLEM — M77.8 FLEXOR TENDINITIS OF HAND: Status: ACTIVE | Noted: 2023-03-16

## 2023-03-16 PROBLEM — F41.9 ANXIETY: Status: ACTIVE | Noted: 2023-03-16

## 2023-03-16 PROBLEM — I73.9 CLAUDICATION (CMS-HCC): Status: ACTIVE | Noted: 2023-03-16

## 2023-03-16 PROBLEM — G45.3 AMAUROSIS FUGAX OF RIGHT EYE: Status: ACTIVE | Noted: 2023-03-16

## 2023-03-16 PROBLEM — I49.3 PVC (PREMATURE VENTRICULAR CONTRACTION): Status: ACTIVE | Noted: 2023-03-16

## 2023-03-16 PROBLEM — Z95.5 PRESENCE OF STENT IN ANTERIOR DESCENDING BRANCH OF LEFT CORONARY ARTERY: Status: ACTIVE | Noted: 2023-03-16

## 2023-03-16 PROBLEM — G47.33 OSA ON CPAP: Status: ACTIVE | Noted: 2023-03-16

## 2023-03-16 PROBLEM — R00.2 PALPITATIONS: Status: ACTIVE | Noted: 2023-03-16

## 2023-03-16 PROBLEM — R79.89: Status: ACTIVE | Noted: 2023-03-16

## 2023-03-16 PROBLEM — R07.9 CHEST PAIN: Status: ACTIVE | Noted: 2023-03-16

## 2023-03-16 PROBLEM — J34.2 NASAL SEPTAL DEVIATION: Status: ACTIVE | Noted: 2023-03-16

## 2023-03-16 PROBLEM — R13.10 PAINFUL SWALLOWING: Status: ACTIVE | Noted: 2023-03-16

## 2023-03-16 PROBLEM — I10 HYPERTENSION: Status: ACTIVE | Noted: 2023-03-16

## 2023-03-16 PROBLEM — N91.5 OLIGOMENORRHEA: Status: ACTIVE | Noted: 2023-03-16

## 2023-03-16 PROBLEM — J34.3 HYPERTROPHY OF NASAL TURBINATES: Status: ACTIVE | Noted: 2023-03-16

## 2023-03-16 PROBLEM — R42 VERTIGO: Status: ACTIVE | Noted: 2023-03-16

## 2023-03-16 PROBLEM — H81.10 BPPV (BENIGN PAROXYSMAL POSITIONAL VERTIGO): Status: ACTIVE | Noted: 2023-03-16

## 2023-03-16 PROBLEM — R06.02 SOBOE (SHORTNESS OF BREATH ON EXERTION): Status: ACTIVE | Noted: 2023-03-16

## 2023-03-16 RX ORDER — CHOLECALCIFEROL (VITAMIN D3) 25 MCG
25 TABLET ORAL DAILY
COMMUNITY

## 2023-03-16 RX ORDER — MULTIVIT-MIN/IRON/FOLIC ACID/K 18-600-40
CAPSULE ORAL
COMMUNITY
End: 2023-03-16 | Stop reason: SDUPTHER

## 2023-03-16 RX ORDER — LANOLIN ALCOHOL/MO/W.PET/CERES
500 CREAM (GRAM) TOPICAL DAILY
COMMUNITY

## 2023-03-16 RX ORDER — ZINC GLUCONATE 50 MG
50 TABLET ORAL DAILY
COMMUNITY

## 2023-03-16 RX ORDER — AMLODIPINE BESYLATE 5 MG/1
5 TABLET ORAL DAILY
COMMUNITY
End: 2023-04-14 | Stop reason: SDUPTHER

## 2023-03-16 RX ORDER — CALCIUM CARBONATE 300MG(750)
800 TABLET,CHEWABLE ORAL DAILY
COMMUNITY

## 2023-03-16 RX ORDER — CALCIUM CARB/VITAMIN D3/VIT K1 500-500-40
1 TABLET,CHEWABLE ORAL DAILY
COMMUNITY

## 2023-03-16 RX ORDER — ZINC GLUCONATE 100 MG
1 TABLET ORAL DAILY
COMMUNITY
End: 2023-03-16 | Stop reason: SDUPTHER

## 2023-03-16 NOTE — PROGRESS NOTES
Patient is sent at the request of Jim Mcgill DO for my opinion regarding Type 2 diabetes.  My final recommendations will be communicated back to the requesting provider by way of shared medical record.    Subjective      Truner Thakkar is a 53 y.o. female who presents for a follow-up evaluation of her Type 2 diabetes mellitus.     Allergies   Allergen Reactions    Percocet [Oxycodone-Acetaminophen] Other     palpitations     Current symptoms/problems include hyperglycemia. Patient has recently discontinued Metformin due to a potential interaction with Dofetilide. The interaction is a category C and it is that the medication increases the serum concentration of the Dofetilide. The patient experienced QT prolongation when started on Dofetilide 250mcg BID, and the dose was then reduced to 125mcg BID. May want to consider restarting the Metformin to get better glycemic control.     Current diabetes regimen is as follows:   Oral agent (monotherapy): Rybelsus 3mg 1 tab PO every day x30 days      SECONDARY PREVENTION  - Statin? Yes  - ACE-I/ARB? Yes  - Aspirin? No    Current monitoring regimen:   Patient is using: glucometer  Home blood sugar records: All BG readings in past couple weeks have been >200mg/d: with one exception of 149mg/dL. The patient states her lower BG was due to just getting done with a walk.     Date       3/16 340mg/dL (steroid shot day prior) 234mg/dL     3/15 231mg/dL 218mg/dL 231mg/dL    3/14 239mg/dL               Any episodes of hypoglycemia? no    Review of Systems    Objective      There were no vitals taken for this visit.    Lab Review  Lab Results   Component Value Date    BILITOT 0.6 02/05/2023    CALCIUM 10.1 02/05/2023    CO2 24 02/05/2023     02/05/2023    CREATININE 0.75 02/05/2023    GLUCOSE 163 (H) 02/05/2023    ALKPHOS 72 02/05/2023    K 4.1 02/05/2023    PROT 7.2 02/05/2023     02/05/2023    AST 46 (H) 02/05/2023    ALT 48 (H) 02/05/2023    BUN 14 02/05/2023     ANIONGAP 14 02/05/2023    MG 1.65 02/05/2023    PHOS 3.8 01/08/2022    ALBUMIN 4.2 02/05/2023    LIPASE 69 02/05/2023    GFRF >90 02/05/2023     Lab Results   Component Value Date    TRIG 193 (H) 01/29/2023    CHOL 194 01/29/2023    HDL 36.6 (A) 01/29/2023     Lab Results   Component Value Date    HGBA1C 8.4 08/10/2021     The ASCVD Risk score (Jared ENAMORADO, et al., 2019) failed to calculate for the following reasons:    The smoking status is invalid      Assessment/Plan      Patient Education  Patient had some questions about her specific medications. Questions were answered and are summarized below:  -Amlodipine: Patient was instructed to hold this medication by her Cardiologist at the most recent appointment on 3/15/2023. This was due to having a low SBP of 106mmHg. Patient does have a BP monitor at home and stated that today her BP was around 145/79mmHg, and so she took her amlodipine. Educated the patient that a low blood pressure could have been causing some of her fatigue. Discussed with the patient to continue taking her blood pressure at home and offered to do a pharmacy appointment with her to validate the BP machine.   -Atorvastatin: Patient expressed concerns with all statins as she has heard they are not great medications. Patient used to take Ezetimibe and questioned why she was switched to a statin at her most recent hospitalization. Educated the patient on the role of statins in decreasing morbidity and mortality and heart benefits that Ezetimibe does not have. Patient understood and stated she would continue taking the Atorvastatin, but wondered if this would be a long term medication. Due to the patient's diabetes diagnosis and her recent stent placement, patient was made aware that for the time being she would be on this medication long term unless told otherwise.   -Clopidogrel: Patient had questions regarding what side effects and interactions Plavix could have. The patient wondered if it could  have been causing her shortness of breath. Shortness of breath is not a side effect that has been reported in drug studies or in the drug resources. Patient is aware that she will be on this medication for one year.   -Metoprolol tartrate: Educated patient on Metoprolol Tartrate 2 times per day vs Metoprolol Succinate daily. At the patients cardiology visit on 2/22/2023, the cardiologist stated that she would switch the medication from Tartrate to Succinate to help decrease fatigue. The patient had stated she just received a 90 day supply, but would contact the cardiologist when the supply is almost through. Patient did not remember this, but stated she understood.   -Rybelsus: Educated the patient on titration of Rybelsus to the 7mg dose to decrease nausea and GI side effects. In addition, educated on the lack of effective BG control with 3mg dose. Patient has 17 tabs left and understands she will be titrating up after that.    Plan/Follow-Up  -Will contact the patient to follow up for an in person visit (preferably after 5pm) to review BG and validate BP machine.  -Will discuss titrating Rybelsus with Dr. Mcgill, as well as the potential to restart Metformin if she does not achieve glycemic control.   -Patient has been walking every day or every other day (weather depending). She has been working towards walking more each day, and is at 1 mile. Continue encouragement with health diet and lifestyle.    Please reach out to the Clinical Pharmacy Team with any additional questions.     Visit Type: Virtual (Video Visit)  Follow up: Will schedule with the patient    Encounter was completed and documented by pharmacy APPE student, Ashley Lujan. I have reviewed the above and attest it is accurate and complete.    Thank you,  Zoey Arevalo, PharmD   Meds PGY1 Pharmacy Resident  [phone]    Verbal consent to manage patient's drug therapy was obtained from patient. They were informed they may decline to participate or  withdraw from participation in pharmacy services at any time.

## 2023-03-27 ENCOUNTER — HOSPITAL ENCOUNTER (OUTPATIENT)
Dept: WOMENS IMAGING | Age: 53
Discharge: HOME | End: 2023-03-29
Payer: COMMERCIAL

## 2023-03-27 ENCOUNTER — HOSPITAL ENCOUNTER (OUTPATIENT)
Dept: ULTRASOUND IMAGING | Age: 53
Discharge: HOME | End: 2023-03-29
Payer: COMMERCIAL

## 2023-03-27 DIAGNOSIS — N63.20 MASS OF LEFT BREAST, UNSPECIFIED QUADRANT: ICD-10-CM

## 2023-03-27 DIAGNOSIS — Z12.39 ENCOUNTER FOR SCREENING FOR MALIGNANT NEOPLASM OF BREAST, UNSPECIFIED SCREENING MODALITY: ICD-10-CM

## 2023-03-27 PROCEDURE — 76642 ULTRASOUND BREAST LIMITED: CPT

## 2023-03-27 PROCEDURE — G0279 TOMOSYNTHESIS, MAMMO: HCPCS

## 2023-03-30 DIAGNOSIS — E78.5 DYSLIPIDEMIA: ICD-10-CM

## 2023-03-31 RX ORDER — ATORVASTATIN CALCIUM 40 MG/1
40 TABLET, FILM COATED ORAL DAILY
Qty: 90 TABLET | Refills: 1 | Status: SHIPPED | OUTPATIENT
Start: 2023-03-31 | End: 2023-06-23 | Stop reason: WASHOUT

## 2023-04-07 ENCOUNTER — OFFICE VISIT (OUTPATIENT)
Dept: PRIMARY CARE | Facility: CLINIC | Age: 53
End: 2023-04-07
Payer: COMMERCIAL

## 2023-04-07 VITALS
OXYGEN SATURATION: 97 % | RESPIRATION RATE: 14 BRPM | HEART RATE: 77 BPM | DIASTOLIC BLOOD PRESSURE: 76 MMHG | WEIGHT: 229 LBS | BODY MASS INDEX: 43.23 KG/M2 | SYSTOLIC BLOOD PRESSURE: 128 MMHG | HEIGHT: 61 IN

## 2023-04-07 DIAGNOSIS — E11.9 TYPE 2 DIABETES MELLITUS WITHOUT COMPLICATION, UNSPECIFIED WHETHER LONG TERM INSULIN USE (MULTI): Primary | ICD-10-CM

## 2023-04-07 DIAGNOSIS — G47.33 OSA ON CPAP: ICD-10-CM

## 2023-04-07 DIAGNOSIS — I10 HYPERTENSION, UNSPECIFIED TYPE: ICD-10-CM

## 2023-04-07 DIAGNOSIS — E78.5 DYSLIPIDEMIA: ICD-10-CM

## 2023-04-07 DIAGNOSIS — E66.01 CLASS 3 SEVERE OBESITY DUE TO EXCESS CALORIES WITH SERIOUS COMORBIDITY AND BODY MASS INDEX (BMI) OF 40.0 TO 44.9 IN ADULT (MULTI): ICD-10-CM

## 2023-04-07 PROBLEM — E66.813 CLASS 3 SEVERE OBESITY DUE TO EXCESS CALORIES WITH SERIOUS COMORBIDITY AND BODY MASS INDEX (BMI) OF 40.0 TO 44.9 IN ADULT: Status: ACTIVE | Noted: 2023-04-07

## 2023-04-07 PROCEDURE — 3078F DIAST BP <80 MM HG: CPT | Performed by: FAMILY MEDICINE

## 2023-04-07 PROCEDURE — 99214 OFFICE O/P EST MOD 30 MIN: CPT | Performed by: FAMILY MEDICINE

## 2023-04-07 PROCEDURE — 3008F BODY MASS INDEX DOCD: CPT | Performed by: FAMILY MEDICINE

## 2023-04-07 PROCEDURE — 1036F TOBACCO NON-USER: CPT | Performed by: FAMILY MEDICINE

## 2023-04-07 PROCEDURE — 4010F ACE/ARB THERAPY RXD/TAKEN: CPT | Performed by: FAMILY MEDICINE

## 2023-04-07 PROCEDURE — 3074F SYST BP LT 130 MM HG: CPT | Performed by: FAMILY MEDICINE

## 2023-04-07 NOTE — PROGRESS NOTES
Subjective   Patient ID: Turner Thakkar is a 53 y.o. female who presents for Diabetes (Pt presents today for DM).    HPI Pt was put on Rybelsus and states her numbers are finally coming down, she brought in her numbers with her, but states it seems better since starting the Rybelsus.    Review of Systems  Constitutional: Fever, weight gain, weight loss, appetite change, night sweats, fatigue, chills.  Eyes : blurry, double vision, vision, loss, tearing, redness, pain, sensitivity to light, glaucoma.  Ears: nose, mouth, and throat: Hearing loss, ringing in the ears, ear pain, nasal congestion, nasal drainage, nosebleeds, mouth, throat, irritation tooth problem.  Cardiovascular: chest pain, pressure, heart racing, palpitations, sweating, leg swelling, high or low blood pressure  Pulmonary: Cough, yellow or green sputum, blood and sputum, shortness of breath, wheezing  Gastrointestinal: Nausea, vomiting, diarrhea, constipation, pain, blood in stool, or vomitus, heartburn, difficulty swallowing  Genitourinary: incontinence, abnormal bleeding, abnormal discharge, urinary frequency, urinary hesitancy, pain, impotence sexual problem, infection, urinary retention  Musculoskeletal: Pain, stiffness, joint, redness or warmth, arthritis, back pain, weakness, muscle wasting, sprain or fracture  Neuro: Weight weakness, dizziness, change in voice, change in taste change in vision, change in hearing, loss, or change of sensation, trouble walking, balance problems coordination problems, shaking, speech problem  Endocrine: cold or heat intolerance, blood sugar problem, weight gain or loss missed periods hot flashes, sweats, change in body hair, change in libido, increased thirst, increased urination  Heme/lymph: Swelling, bleeding, problem anemia, bruising, enlarged lymph nodes  Allergic/immunologic: H. plus nasal drip, watery itchy eyes, nasal drainage, immunosuppressed  The above were reviewed and noted negative except as noted in HPI  "and Problem List.     Objective   /76 (BP Location: Left arm, Patient Position: Sitting, BP Cuff Size: Adult)   Pulse 77   Resp 14   Ht 1.549 m (5' 1\")   Wt 104 kg (229 lb)   SpO2 97%   BMI 43.27 kg/m²     Physical Exam  CONSTITUTIONAL- NAD, Pt is A & O x3, Vital signs reviewed per chart.   General Appearance- normal , good hygiene,talks easily   EYES-PERRLA conjunctiva and lids- normal   EARS/NOSE-TM's normal, head normocephalic and atraumatic, naso pharynx-no nasal discharge, no trismus,   oropharynx- normal without exudate   NECK- supple, FROM, without mass   thyroid- NT, normal size, no nodule noted   LYMPH- WNL   CV- RRR without murmur, gallop, or other abnormality.   PULM- CTA bilaterally   Respiratory effort- normal respiratory effort , no retractions or nasal flaring       SKIN- no abnormal skin lesions on inspection or palpation   neuro- no focal deficits   PSYCH- pleasant, normal judgement and insight     Assessment/Plan   Problem List Items Addressed This Visit          Nervous    MAYELA on CPAP       Circulatory    Hypertension       Endocrine/Metabolic    Type 2 diabetes mellitus (CMS/HCC) - Primary    Class 3 severe obesity due to excess calories with serious comorbidity and body mass index (BMI) of 40.0 to 44.9 in adult (CMS/HCC)    BMI 40.0-44.9, adult (CMS/HCC)       Other    Dyslipidemia        Patient advised of weight reducing program to include low-fat diet, moderate exercise program to include 150 minutes/week. Discussed risks of CAD and DM with obesity. Follow up as noted in chart     Increase Rybelsus to 7 mg.     continue all current medications and therapy for chronic medical conditions     Scribe Attestation  By signing my name below, Darius BRANDT RMA   , Scribe   attest that this documentation has been prepared under the direction and in the presence of Jim Mcgill DO.     Provider Attestation - Scribe documentation    All medical record entries made by the Scribe " were at my direction and personally dictated by me. I have reviewed the chart and agree that the record accurately reflects my personal performance of the history, physical exam, discussion and plan.

## 2023-04-14 DIAGNOSIS — I10 PRIMARY HYPERTENSION: Primary | ICD-10-CM

## 2023-04-15 RX ORDER — AMLODIPINE BESYLATE 5 MG/1
5 TABLET ORAL DAILY
Qty: 90 TABLET | Refills: 1 | Status: SHIPPED | OUTPATIENT
Start: 2023-04-15 | End: 2023-10-24

## 2023-04-20 ENCOUNTER — TELEPHONE (OUTPATIENT)
Dept: PRIMARY CARE | Facility: CLINIC | Age: 53
End: 2023-04-20
Payer: COMMERCIAL

## 2023-04-20 DIAGNOSIS — Z13.6 SCREENING FOR HEART DISEASE: Primary | ICD-10-CM

## 2023-04-20 NOTE — TELEPHONE ENCOUNTER
Dr. Mcgill patient    Patient is calling to request order for a Coronary calcium score test    Please advise, thank you

## 2023-05-04 PROCEDURE — 96372 THER/PROPH/DIAG INJ SC/IM: CPT | Performed by: NURSE PRACTITIONER

## 2023-05-04 RX ADMIN — TRIAMCINOLONE ACETONIDE 64 MG: 40 INJECTION, SUSPENSION INTRA-ARTICULAR; INTRAMUSCULAR at 13:07

## 2023-05-12 ENCOUNTER — OFFICE VISIT (OUTPATIENT)
Dept: PRIMARY CARE | Facility: CLINIC | Age: 53
End: 2023-05-12
Payer: COMMERCIAL

## 2023-05-12 VITALS
BODY MASS INDEX: 42.03 KG/M2 | HEART RATE: 68 BPM | RESPIRATION RATE: 16 BRPM | DIASTOLIC BLOOD PRESSURE: 78 MMHG | SYSTOLIC BLOOD PRESSURE: 130 MMHG | OXYGEN SATURATION: 97 % | WEIGHT: 222.6 LBS | HEIGHT: 61 IN

## 2023-05-12 DIAGNOSIS — Z95.5 PRESENCE OF STENT IN ANTERIOR DESCENDING BRANCH OF LEFT CORONARY ARTERY: ICD-10-CM

## 2023-05-12 DIAGNOSIS — I10 PRIMARY HYPERTENSION: ICD-10-CM

## 2023-05-12 DIAGNOSIS — E78.5 DYSLIPIDEMIA: ICD-10-CM

## 2023-05-12 DIAGNOSIS — E11.9 TYPE 2 DIABETES MELLITUS WITHOUT COMPLICATION, UNSPECIFIED WHETHER LONG TERM INSULIN USE (MULTI): ICD-10-CM

## 2023-05-12 DIAGNOSIS — G47.33 OSA ON CPAP: ICD-10-CM

## 2023-05-12 DIAGNOSIS — I25.10 CORONARY ARTERY DISEASE INVOLVING NATIVE HEART WITHOUT ANGINA PECTORIS, UNSPECIFIED VESSEL OR LESION TYPE: ICD-10-CM

## 2023-05-12 LAB — POC HEMOGLOBIN A1C: 6.5 % (ref 4.2–6.5)

## 2023-05-12 PROCEDURE — 83036 HEMOGLOBIN GLYCOSYLATED A1C: CPT | Performed by: FAMILY MEDICINE

## 2023-05-12 PROCEDURE — 99214 OFFICE O/P EST MOD 30 MIN: CPT | Performed by: FAMILY MEDICINE

## 2023-05-12 RX ORDER — METOPROLOL SUCCINATE 50 MG/1
50 TABLET, EXTENDED RELEASE ORAL NIGHTLY
COMMUNITY
Start: 2023-03-27 | End: 2023-06-23 | Stop reason: SDUPTHER

## 2023-05-12 ASSESSMENT — ENCOUNTER SYMPTOMS
ENDOCRINE NEGATIVE: 1
RESPIRATORY NEGATIVE: 1
PSYCHIATRIC NEGATIVE: 1
CARDIOVASCULAR NEGATIVE: 1
DIABETIC ASSOCIATED SYMPTOMS: 0
MUSCULOSKELETAL NEGATIVE: 1
EYES NEGATIVE: 1
GASTROINTESTINAL NEGATIVE: 1
CONSTITUTIONAL NEGATIVE: 1
HEMATOLOGIC/LYMPHATIC NEGATIVE: 1
ALLERGIC/IMMUNOLOGIC NEGATIVE: 1
NEUROLOGICAL NEGATIVE: 1

## 2023-05-12 NOTE — PROGRESS NOTES
"Subjective   Patient ID: Turner Thakkar is a 53 y.o. female who presents for Diabetes.    Diabetes  She presents for her follow-up diabetic visit. She has type 2 diabetes mellitus. There are no hypoglycemic associated symptoms. There are no diabetic associated symptoms. There are no hypoglycemic complications. Symptoms are stable. There are no diabetic complications. Risk factors for coronary artery disease include diabetes mellitus, obesity, hypertension, dyslipidemia and stress. Current diabetic treatment includes oral agent (triple therapy). Her weight is stable. She is following a generally healthy diet.        Review of Systems   Constitutional: Negative.    HENT: Negative.     Eyes: Negative.    Respiratory: Negative.     Cardiovascular: Negative.    Gastrointestinal: Negative.    Endocrine: Negative.    Genitourinary: Negative.    Musculoskeletal: Negative.    Skin: Negative.    Allergic/Immunologic: Negative.    Neurological: Negative.    Hematological: Negative.    Psychiatric/Behavioral: Negative.     All other systems reviewed and are negative.      Objective   /78 (BP Location: Right arm, Patient Position: Sitting, BP Cuff Size: Adult)   Pulse 68   Resp 16   Ht 1.549 m (5' 1\")   Wt 101 kg (222 lb 9.6 oz)   SpO2 97%   BMI 42.06 kg/m²     Physical Exam CONSTITUTIONAL- NAD, Pt is A & O x3, Vital signs reviewed per chart.  General Appearance- normal , good hygiene,talks easily  EYES-PERRLA conjunctiva and lids- normal  EARS/NOSE-TM's normal, head normocephalic and atraumatic, naso pharynx-no nasal discharge, no trismus,  oropharynx- normal without exudate  NECK- supple, FROM, without mass  thyroid- NT, normal size, no nodule noted  LYMPH- WNL  CV- RRR without murmur, gallop, or other abnormality.  PULM- CTA bilaterally  Respiratory effort- normal respiratory effort , no retractions or nasal flaring  ABDOMEN- normoactive BS's , soft , NT, no hepatosplenomegaly palpated, or masses. No pulsatile masses " noted  extremities no edema,NT  SKIN- no abnormal skin lesions on inspection or palpation  neuro- no focal deficits  PSYCH- pleasant, normal judgement and insight      Assessment/Plan   Problem List Items Addressed This Visit       Dyslipidemia    Hypertension    CAD (coronary artery disease)    Relevant Medications    metoprolol succinate XL (Toprol-XL) 50 mg 24 hr tablet    MAYELA on CPAP    Type 2 diabetes mellitus (CMS/[facility])    Relevant Medications    semaglutide (Rybelsus) 14 mg tablet tablet    Other Relevant Orders    POCT glycosylated hemoglobin (Hb A1C) manually resulted    Presence of stent in anterior descending branch of left coronary artery    BMI 40.0-44.9, adult (CMS/[facility]) - Primary        Scribe Attestation  By signing my name below, I, Camille Hicks MA , Scribe   attest that this documentation has been prepared under the direction and in the presence of Jim Mcgill DO.

## 2023-05-19 ENCOUNTER — APPOINTMENT (OUTPATIENT)
Dept: PRIMARY CARE | Facility: CLINIC | Age: 53
End: 2023-05-19
Payer: COMMERCIAL

## 2023-05-23 ENCOUNTER — PATIENT MESSAGE (OUTPATIENT)
Dept: PRIMARY CARE | Facility: CLINIC | Age: 53
End: 2023-05-23
Payer: COMMERCIAL

## 2023-05-23 DIAGNOSIS — E78.5 DYSLIPIDEMIA: ICD-10-CM

## 2023-05-23 RX ORDER — EZETIMIBE 10 MG/1
10 TABLET ORAL DAILY
Qty: 30 TABLET | Refills: 5 | Status: SHIPPED | OUTPATIENT
Start: 2023-05-23 | End: 2023-08-14

## 2023-05-23 NOTE — TELEPHONE ENCOUNTER
From: Turner Thakkar  To: Jim Mcgill DO  Sent: 5/23/2023 8:43 AM EDT  Subject: Lipitor    I have been experiencing muscle pain and weakness in my right arm. This has been going in since February. I've not mentioned it because I wanted to see if it went away after being on the medication for a while. Now it has started in my left arm. I want to discontinue taking this medication. I have an appointment with you in June. So I wanted to talk to you about Zieta and Niacin. Thank you for your time.  Turner Thakkar

## 2023-06-23 ENCOUNTER — OFFICE VISIT (OUTPATIENT)
Dept: PRIMARY CARE | Facility: CLINIC | Age: 53
End: 2023-06-23
Payer: COMMERCIAL

## 2023-06-23 VITALS
HEIGHT: 61 IN | HEART RATE: 84 BPM | RESPIRATION RATE: 15 BRPM | BODY MASS INDEX: 41.16 KG/M2 | OXYGEN SATURATION: 98 % | DIASTOLIC BLOOD PRESSURE: 80 MMHG | WEIGHT: 218 LBS | SYSTOLIC BLOOD PRESSURE: 134 MMHG

## 2023-06-23 DIAGNOSIS — M75.01 ADHESIVE CAPSULITIS OF RIGHT SHOULDER: ICD-10-CM

## 2023-06-23 DIAGNOSIS — E11.9 TYPE 2 DIABETES MELLITUS WITHOUT COMPLICATION, UNSPECIFIED WHETHER LONG TERM INSULIN USE (MULTI): ICD-10-CM

## 2023-06-23 DIAGNOSIS — M75.20 BICEPS TENDINITIS, UNSPECIFIED LATERALITY: ICD-10-CM

## 2023-06-23 DIAGNOSIS — I25.10 CORONARY ARTERY DISEASE INVOLVING NATIVE HEART WITHOUT ANGINA PECTORIS, UNSPECIFIED VESSEL OR LESION TYPE: Primary | ICD-10-CM

## 2023-06-23 DIAGNOSIS — R63.5 WEIGHT GAIN: ICD-10-CM

## 2023-06-23 DIAGNOSIS — I10 PRIMARY HYPERTENSION: ICD-10-CM

## 2023-06-23 DIAGNOSIS — E78.5 DYSLIPIDEMIA: ICD-10-CM

## 2023-06-23 PROCEDURE — 99214 OFFICE O/P EST MOD 30 MIN: CPT | Performed by: FAMILY MEDICINE

## 2023-06-23 RX ORDER — METOPROLOL SUCCINATE 50 MG/1
50 TABLET, EXTENDED RELEASE ORAL NIGHTLY
Qty: 90 TABLET | Refills: 3 | Status: SHIPPED | OUTPATIENT
Start: 2023-06-23 | End: 2024-04-15 | Stop reason: SDUPTHER

## 2023-06-23 ASSESSMENT — ENCOUNTER SYMPTOMS
CARDIOVASCULAR NEGATIVE: 1
ALLERGIC/IMMUNOLOGIC NEGATIVE: 1
HEMATOLOGIC/LYMPHATIC NEGATIVE: 1
GASTROINTESTINAL NEGATIVE: 1
NEUROLOGICAL NEGATIVE: 1
MUSCULOSKELETAL NEGATIVE: 1
PSYCHIATRIC NEGATIVE: 1
EYES NEGATIVE: 1
RESPIRATORY NEGATIVE: 1
ENDOCRINE NEGATIVE: 1
CONSTITUTIONAL NEGATIVE: 1

## 2023-06-23 NOTE — PROGRESS NOTES
"Subjective   Patient ID: Turner Thakkar is a 53 y.o. female who presents for a follow up on DM     HPI pt is following up on DM. Pt is taking rybelsus 14mg. Pts glucose this morning was 85. She is tolerating the rybelsus well.     Pt is also having right shoulder pain that has been going on for a while. No injury that pt is aware of. Pt has limited ROM and has some issues with lifting heavy things.     pt is going to orientation for cardiac rehab. Pt would like blood work ordered today to test cholesterol. Pt alos needs the date of her last A1C tests.     Review of Systems   Constitutional: Negative.    HENT: Negative.     Eyes: Negative.    Respiratory: Negative.     Cardiovascular: Negative.    Gastrointestinal: Negative.    Endocrine: Negative.    Genitourinary: Negative.    Musculoskeletal: Negative.    Skin: Negative.    Allergic/Immunologic: Negative.    Neurological: Negative.    Hematological: Negative.    Psychiatric/Behavioral: Negative.         Objective   /80   Pulse 84   Resp 15   Ht 1.549 m (5' 1\")   Wt 98.9 kg (218 lb)   SpO2 98%   BMI 41.19 kg/m²     Physical Exam CONSTITUTIONAL- NAD, Pt is A & O x3, Vital signs reviewed per chart.  General Appearance- normal , good hygiene,talks easily  EYES-PERRLA conjunctiva and lids- normal  EARS/NOSE-TM's normal, head normocephalic and atraumatic, naso pharynx-no nasal discharge, no trismus,  oropharynx- normal without exudate  NECK- supple, FROM, without mass  thyroid- NT, normal size, no nodule noted  LYMPH- WNL  CV- RRR without murmur, gallop, or other abnormality.  PULM- CTA bilaterally  Respiratory effort- normal respiratory effort , no retractions or nasal flaring  ABDOMEN- normoactive BS's , soft , NT, no hepatosplenomegaly palpated, or masses. No pulsatile masses noted  extremities no edema,NT  SKIN- no abnormal skin lesions on inspection or palpation  neuro- no focal deficits  PSYCH- pleasant, normal judgement and insight     Assessment/Plan "   Problem List Items Addressed This Visit       Dyslipidemia    Relevant Orders    Lipid Panel    Hypertension    Relevant Medications    metoprolol succinate XL (Toprol-XL) 50 mg 24 hr tablet    CAD (coronary artery disease) - Primary    Relevant Medications    metoprolol succinate XL (Toprol-XL) 50 mg 24 hr tablet    Type 2 diabetes mellitus (CMS/[facility])    Relevant Orders    Hemoglobin A1C     Other Visit Diagnoses       Adhesive capsulitis of right shoulder        Relevant Orders    Referral to Physical Therapy    XR shoulder right 2+ views    Biceps tendinitis, unspecified laterality        Relevant Orders    Referral to Physical Therapy    XR shoulder right 2+ views    Weight gain                 Scribe Attestation  By signing my name below, I, Jim Mcgill DO  , Scribe   attest that this documentation has been prepared under the direction and in the presence of Jim Mcgill DO.

## 2023-06-24 ENCOUNTER — LAB (OUTPATIENT)
Dept: LAB | Facility: LAB | Age: 53
End: 2023-06-24
Payer: COMMERCIAL

## 2023-06-24 DIAGNOSIS — E11.9 TYPE 2 DIABETES MELLITUS WITHOUT COMPLICATION, UNSPECIFIED WHETHER LONG TERM INSULIN USE (MULTI): ICD-10-CM

## 2023-06-24 DIAGNOSIS — E78.5 DYSLIPIDEMIA: ICD-10-CM

## 2023-06-24 LAB
CHOLESTEROL (MG/DL) IN SER/PLAS: 195 MG/DL (ref 0–199)
CHOLESTEROL IN HDL (MG/DL) IN SER/PLAS: 44.1 MG/DL
CHOLESTEROL/HDL RATIO: 4.4
ESTIMATED AVERAGE GLUCOSE FOR HBA1C: 108 MG/DL
HEMOGLOBIN A1C/HEMOGLOBIN TOTAL IN BLOOD: 5.4 %
LDL: 126 MG/DL (ref 0–99)
TRIGLYCERIDE (MG/DL) IN SER/PLAS: 123 MG/DL (ref 0–149)
VLDL: 25 MG/DL (ref 0–40)

## 2023-06-24 PROCEDURE — 36415 COLL VENOUS BLD VENIPUNCTURE: CPT

## 2023-06-24 PROCEDURE — 83036 HEMOGLOBIN GLYCOSYLATED A1C: CPT

## 2023-06-24 PROCEDURE — 80061 LIPID PANEL: CPT

## 2023-06-26 DIAGNOSIS — B35.9 TINEA: Primary | ICD-10-CM

## 2023-06-26 NOTE — TELEPHONE ENCOUNTER
Dr. Mcgill patient  Econazole nitrate cream 1%  [id number] - [place], [place] - [address]

## 2023-06-27 RX ORDER — ECONAZOLE NITRATE 10 MG/G
1 CREAM TOPICAL 2 TIMES DAILY
Qty: 85 G | Refills: 1 | Status: SHIPPED | OUTPATIENT
Start: 2023-06-27 | End: 2023-08-09

## 2023-07-13 ENCOUNTER — OFFICE VISIT (OUTPATIENT)
Dept: PRIMARY CARE | Facility: CLINIC | Age: 53
End: 2023-07-13
Payer: COMMERCIAL

## 2023-07-13 VITALS
OXYGEN SATURATION: 95 % | DIASTOLIC BLOOD PRESSURE: 80 MMHG | SYSTOLIC BLOOD PRESSURE: 130 MMHG | RESPIRATION RATE: 16 BRPM | HEIGHT: 61 IN | HEART RATE: 73 BPM | BODY MASS INDEX: 41.76 KG/M2 | TEMPERATURE: 96.9 F | WEIGHT: 221.2 LBS

## 2023-07-13 DIAGNOSIS — R07.81 RIB PAIN ON RIGHT SIDE: ICD-10-CM

## 2023-07-13 DIAGNOSIS — M94.0 ACUTE COSTOCHONDRITIS: Primary | ICD-10-CM

## 2023-07-13 DIAGNOSIS — R07.81 RIB TENDERNESS: ICD-10-CM

## 2023-07-13 DIAGNOSIS — E66.01 CLASS 3 SEVERE OBESITY DUE TO EXCESS CALORIES WITH SERIOUS COMORBIDITY AND BODY MASS INDEX (BMI) OF 40.0 TO 44.9 IN ADULT (MULTI): ICD-10-CM

## 2023-07-13 PROCEDURE — 99213 OFFICE O/P EST LOW 20 MIN: CPT | Performed by: NURSE PRACTITIONER

## 2023-07-13 RX ORDER — NORETHINDRONE 5 MG/1
5 TABLET ORAL DAILY
COMMUNITY
Start: 2023-07-12

## 2023-07-13 RX ORDER — METHOCARBAMOL 500 MG/1
500 TABLET, FILM COATED ORAL 4 TIMES DAILY PRN
Qty: 40 TABLET | Refills: 0 | Status: SHIPPED | OUTPATIENT
Start: 2023-07-13 | End: 2024-01-10 | Stop reason: WASHOUT

## 2023-07-13 ASSESSMENT — PATIENT HEALTH QUESTIONNAIRE - PHQ9
2. FEELING DOWN, DEPRESSED OR HOPELESS: NOT AT ALL
1. LITTLE INTEREST OR PLEASURE IN DOING THINGS: NOT AT ALL
1. LITTLE INTEREST OR PLEASURE IN DOING THINGS: NOT AT ALL
2. FEELING DOWN, DEPRESSED OR HOPELESS: NOT AT ALL
SUM OF ALL RESPONSES TO PHQ9 QUESTIONS 1 AND 2: 0
SUM OF ALL RESPONSES TO PHQ9 QUESTIONS 1 AND 2: 0

## 2023-07-13 ASSESSMENT — ENCOUNTER SYMPTOMS
OCCASIONAL FEELINGS OF UNSTEADINESS: 0
LOSS OF SENSATION IN FEET: 0
DEPRESSION: 0

## 2023-07-13 NOTE — PROGRESS NOTES
Subjective   Patient ID: Turner Thakkar is a 53 y.o. female who is with chief complaint of pain the right lower ribs on the anterior aspect of the chest.    HPI  Patient is a 53 y.o. female who CONSULTED AT [facility] today. Patient is with complaints of   pain the right lower ribs on the anterior aspect of the chest. Patient states symptoms has been going on for 7 days. Patient has not taken any medication for relief of symptoms. She denies having injured the area but admitted to having done too much movement on her right chest while on a trip to [place] last week. Patient states the pain is on the front chest right side, achy / sharp in character, intermittent, aggravated by movement (deep inspiration, coughing forcefully, twisting) and non radiating. she denies cough, shortness of breath, fever, chills, paresthesia, nor paralysis.    Review of Systems  General: no weight loss, generally healthy, no fatigue  Head:  no headaches / sinus pain, no vertigo, no injury  Eyes: no diplopia, no tearing, no pain,   Ears: no change in hearing, no tinnitus, no bleeding, no vertigo  Mouth:  no dental difficulties, no gingival bleeding, no sore throat, no loss of sense of taste  Nose: no congestion, no  discharge, no bleeding, no obstruction, no loss of sense of smell  Neck: no stiffness, no pain, no tenderness, no masses, no bruit  Pulmonary: no dyspnea, no wheezing, no hemoptysis, no cough  Cardiovascular: no chest pain, no palpitations, no syncope, no orthopnea  Gastrointestinal: no change in appetite, no dysphagia, no abdominal pains, no diarrhea, no emesis, no melena  Genito Urinary: no dysuria, no urinary urgency, no nocturia, no incontinence, no change in nature of urine  Musculoskeletal: (+) pain the right lower ribs on the anterior aspect of the chest on movement particularly on deep inspiration, no limitation of range of motion, no paresthesia, no numbness  Constitutional: no fever, no chills, no  night sweats    Objective   Physical Exam  General: ambulatory, in no acute distress  Head: normocephalic, no lesions  Nose: nasal mucosa normal, no nasal discharge, no bleeding, no obstruction  Throat: clear, no exudate, no lesions  Neck: supple, no masses, no bruits  Chest: symmetrical chest expansion, no lagging, no retractions, clear breath sounds, no rales, no wheezes  Musculoskeletal: (+) tenderness on the costochondral joints of the right 7th, 8th, 9th, and 10th ribs, no limitation of range of motion, no paralysis, no deformity, no crepitus  Extremities: full and equal peripheral pulses, no edema,    Assessment/Plan   Problem List Items Addressed This Visit       Class 3 severe obesity due to excess calories with serious comorbidity and body mass index (BMI) of 40.0 to 44.9 in adult (CMS/[facility])    BMI 40.0-44.9, adult (CMS/[facility])     Other Visit Diagnoses       Acute costochondritis    -  Primary    Relevant Medications    methocarbamol (Robaxin) 500 mg tablet    Rib pain on right side        Relevant Medications    methocarbamol (Robaxin) 500 mg tablet    Rib tenderness        Relevant Medications    methocarbamol (Robaxin) 500 mg tablet        DISCHARGE SUMMARY:   Patient was seen and examined. Diagnosis, treatment, treatment options, and possible complications of today's illness discussed and explained to patient. Patient to take medication/s associated with this visit.  Patient may use OTC menthol patches as needed for comfort. Advised to avoid heavy lifting, pulling, or pushing for at least a week.    Patient to come back in 4 - 7 days if needed for worsening symptoms. Patient verbalized understanding of plan of care.    Patient educated and advised on low fat/low salt/high fiber diet, health maintenance, healthy diet, and importance of exercise.

## 2023-07-13 NOTE — PROGRESS NOTES
"Subjective   Patient ID: Turner Thakkar is a 53 y.o. female who presents for Chest Pain.    Chest Pain   This is a new problem. The current episode started 1 to 4 weeks ago. The onset quality is sudden. The problem occurs constantly. The problem has been gradually worsening. The pain is present in the epigastric region. The pain is at a severity of 8/10. The pain is severe. The quality of the pain is described as sharp. The pain radiates to the epigastrium. The pain is aggravated by deep breathing, lifting, movement and lifting arm. She has tried nothing for the symptoms. The treatment provided no relief.        Review of Systems   Cardiovascular:  Positive for chest pain.       Objective   /80   Pulse 73   Temp 36.1 °C (96.9 °F) (Temporal)   Resp 16   Ht 1.549 m (5' 1\")   Wt 100 kg (221 lb 3.2 oz)   SpO2 92%   BMI 41.80 kg/m²     Physical Exam    Assessment/Plan          "

## 2023-07-13 NOTE — PATIENT INSTRUCTIONS
DISCHARGE SUMMARY:   Patient was seen and examined. Diagnosis, treatment, treatment options, and possible complications of today's illness discussed and explained to patient. Patient to take medication/s associated with this visit.  Patient may use OTC menthol patches as needed for comfort. Advised to avoid heavy lifting, pulling, or pushing for at least a week.    Patient to come back in 4 - 7 days if needed for worsening symptoms. Patient verbalized understanding of plan of care.    Patient educated and advised on low fat/low salt/high fiber diet, health maintenance, healthy diet, and importance of exercise.

## 2023-07-28 ENCOUNTER — OFFICE VISIT (OUTPATIENT)
Dept: PRIMARY CARE | Facility: CLINIC | Age: 53
End: 2023-07-28
Payer: COMMERCIAL

## 2023-07-28 VITALS
WEIGHT: 222 LBS | BODY MASS INDEX: 41.91 KG/M2 | SYSTOLIC BLOOD PRESSURE: 124 MMHG | HEART RATE: 73 BPM | HEIGHT: 61 IN | OXYGEN SATURATION: 99 % | DIASTOLIC BLOOD PRESSURE: 72 MMHG | RESPIRATION RATE: 16 BRPM

## 2023-07-28 DIAGNOSIS — E78.5 DYSLIPIDEMIA: ICD-10-CM

## 2023-07-28 DIAGNOSIS — M79.601 PAIN OF RIGHT UPPER EXTREMITY: ICD-10-CM

## 2023-07-28 DIAGNOSIS — I10 PRIMARY HYPERTENSION: ICD-10-CM

## 2023-07-28 PROCEDURE — 99214 OFFICE O/P EST MOD 30 MIN: CPT | Performed by: FAMILY MEDICINE

## 2023-07-28 NOTE — PATIENT INSTRUCTIONS
Patient was advised importance of proper diet/nutrition in addition adequate hydration. Patient was encouraged moderate exercise program to include 30 minutes daily for 5 days of the week or 150 minutes weekly. Patient will follow-up with us as scheduled.     continue all current medications and therapy for chronic medical conditions

## 2023-07-28 NOTE — PROGRESS NOTES
Subjective   Patient ID: Turner Thakkar is a 53 y.o. female who presents for Arm Pain (he is here for right arm pain. She starting therapy one a week.  /Arm pain is manageable.  She still have rom reduction.  ).    HPI   Patient presents in office for a follow up of right arm pain. Patient admits she has been still experiencing the pains and decreased range of motion with her right arm. Patient admits she has been performing at home physical therapy to help with her pains.    Review of Systems  12 Systems have been reviewed as follows.  Constitutional: Fever, weight gain, weight loss, appetite change, night sweats, fatigue, chills.  Eyes : blurry, double vision, vision, loss, tearing, redness, pain, sensitivity to light, glaucoma.  Ears: nose, mouth, and throat: Hearing loss, ringing in the ears, ear pain, nasal congestion, nasal drainage, nosebleeds, mouth, throat, irritation tooth problem.  Cardiovascular: chest pain, pressure, heart racing, palpitations, sweating, leg swelling, high or low blood pressure  Pulmonary: Cough, yellow or green sputum, blood and sputum, shortness of breath, wheezing  Gastrointestinal: Nausea, vomiting, diarrhea, constipation, pain, blood in stool, or vomitus, heartburn, difficulty swallowing  Genitourinary: incontinence, abnormal bleeding, abnormal discharge, urinary frequency, urinary hesitancy, pain, impotence sexual problem, infection, urinary retention  Musculoskeletal: Pain, stiffness, joint, redness or warmth, arthritis, back pain, weakness, muscle wasting, sprain or fracture  Neuro: Weight weakness, dizziness, change in voice, change in taste change in vision, change in hearing, loss, or change of sensation, trouble walking, balance problems coordination problems, shaking, speech problem  Endocrine: cold or heat intolerance, blood sugar problem, weight gain or loss missed periods hot flashes, sweats, change in body hair, change in libido, increased thirst, increased  "urination  Heme/lymph: Swelling, bleeding, problem anemia, bruising, enlarged lymph nodes  Allergic/immunologic: H. plus nasal drip, watery itchy eyes, nasal drainage, immunosuppressed  The above were reviewed and noted negative except as noted in HPI and Problem List.     Objective   /72 (BP Location: Left arm, Patient Position: Sitting, BP Cuff Size: Adult)   Pulse 73   Resp 16   Ht 1.549 m (5' 1\")   Wt 101 kg (222 lb)   SpO2 99%   BMI 41.95 kg/m²     Physical Exam  CONSTITUTIONAL- NAD, Pt is A & O x3, Vital signs reviewed per chart.   General Appearance- normal , good hygiene,talks easily   EYES-PERRLA conjunctiva and lids- normal   EARS/NOSE-TM's normal, head normocephalic and atraumatic, naso pharynx-no nasal discharge, no trismus,   oropharynx- normal without exudate   NECK- supple, FROM, without mass   thyroid- NT, normal size, no nodule noted   LYMPH- WNL   CV- RRR without murmur, gallop, or other abnormality.   PULM- CTA bilaterally   Respiratory effort- normal respiratory effort , no retractions or nasal flaring   SKIN- no abnormal skin lesions on inspection or palpation   neuro- no focal deficits   PSYCH- pleasant, normal judgement and insight     Assessment/Plan   Problem List Items Addressed This Visit       Dyslipidemia    Hypertension    Pain of right upper extremity     In PT, mild OA.          Scribe Attestation  By signing my name below, IDarius RMA   , Victoria   attest that this documentation has been prepared under the direction and in the presence of Jim Mcgill DO.     Provider Attestation - Scribe documentation    All medical record entries made by the Scribe were at my direction and personally dictated by me. I have reviewed the chart and agree that the record accurately reflects my personal performance of the history, physical exam, discussion and plan.      "

## 2023-08-09 DIAGNOSIS — I49.3 PVC (PREMATURE VENTRICULAR CONTRACTION): Primary | ICD-10-CM

## 2023-08-09 DIAGNOSIS — B35.9 TINEA: ICD-10-CM

## 2023-08-09 RX ORDER — ECONAZOLE NITRATE 10 MG/G
1 CREAM TOPICAL 2 TIMES DAILY
Qty: 85 G | Refills: 1 | Status: SHIPPED | OUTPATIENT
Start: 2023-08-09

## 2023-08-13 DIAGNOSIS — E78.5 DYSLIPIDEMIA: ICD-10-CM

## 2023-08-14 RX ORDER — EZETIMIBE 10 MG/1
10 TABLET ORAL DAILY
Qty: 90 TABLET | Refills: 0 | Status: SHIPPED | OUTPATIENT
Start: 2023-08-14 | End: 2023-08-21 | Stop reason: SDUPTHER

## 2023-08-16 NOTE — TELEPHONE ENCOUNTER
PT IS STATING SHE IS UNABLE TO HAVE EZETIMIBE 10MG UNTIL A DR VERBALLY CALLS [facility] TO VERIFY INFORMATION, THEY HAVE BEEN TRYING TO REACH OUT VIA FAX?  PLEASE ADVISE SO PT CAN HAVE PRESCRIPTION SENT OUT

## 2023-08-20 DIAGNOSIS — E11.9 TYPE 2 DIABETES MELLITUS WITHOUT COMPLICATION, UNSPECIFIED WHETHER LONG TERM INSULIN USE (MULTI): ICD-10-CM

## 2023-08-21 DIAGNOSIS — E78.5 DYSLIPIDEMIA: ICD-10-CM

## 2023-08-21 RX ORDER — EZETIMIBE 10 MG/1
10 TABLET ORAL DAILY
Qty: 90 TABLET | Refills: 3 | Status: SHIPPED | OUTPATIENT
Start: 2023-08-21 | End: 2024-08-20

## 2023-08-21 RX ORDER — ORAL SEMAGLUTIDE 14 MG/1
14 TABLET ORAL DAILY
Qty: 30 TABLET | Refills: 3 | Status: SHIPPED | OUTPATIENT
Start: 2023-08-21 | End: 2023-09-11 | Stop reason: SDUPTHER

## 2023-08-21 NOTE — TELEPHONE ENCOUNTER
Rx Refill Request Telephone Encounter    Name:  Turner Thakkar  :  [id number]  Medication Name: Ezetimibe  Dose : 10 mg  Directions : Take 1 tablet daily  Specific Pharmacy location:  [facility]  Date of last appointment:  2023  Date of next appointment:  2023  Best number to reach patient:  [phone]

## 2023-08-25 DIAGNOSIS — I25.10 CORONARY ARTERY DISEASE INVOLVING NATIVE HEART WITHOUT ANGINA PECTORIS, UNSPECIFIED VESSEL OR LESION TYPE: Primary | ICD-10-CM

## 2023-08-25 RX ORDER — DOFETILIDE 0.12 MG/1
125 CAPSULE ORAL EVERY 12 HOURS
Qty: 60 CAPSULE | Refills: 3 | Status: SHIPPED | OUTPATIENT
Start: 2023-08-25 | End: 2024-01-10 | Stop reason: WASHOUT

## 2023-08-28 RX ORDER — DOFETILIDE 0.12 MG/1
125 CAPSULE ORAL EVERY 12 HOURS
Qty: 180 CAPSULE | Refills: 3 | Status: SHIPPED | OUTPATIENT
Start: 2023-08-28

## 2023-09-01 PROBLEM — Z79.01 ANTICOAGULANT LONG-TERM USE: Status: ACTIVE | Noted: 2023-09-01

## 2023-09-01 PROBLEM — R07.89 CHEST PRESSURE: Status: ACTIVE | Noted: 2023-09-01

## 2023-09-01 PROBLEM — M77.8 FLEXOR CARPI ULNARIS TENDINITIS: Status: ACTIVE | Noted: 2023-09-01

## 2023-09-01 PROBLEM — E66.813 OBESITY, CLASS III, BMI 40-49.9 (MORBID OBESITY): Status: ACTIVE | Noted: 2022-02-23

## 2023-09-01 PROBLEM — E78.5 HYPERLIPIDEMIA: Status: ACTIVE | Noted: 2023-09-01

## 2023-09-01 PROBLEM — N28.1 RENAL CYST: Status: ACTIVE | Noted: 2017-12-26

## 2023-09-01 PROBLEM — R06.00 DYSPNEA: Status: ACTIVE | Noted: 2023-09-01

## 2023-09-01 PROBLEM — G47.30 SLEEP APNEA: Status: ACTIVE | Noted: 2023-09-01

## 2023-09-01 PROBLEM — E66.01 OBESITY, CLASS III, BMI 40-49.9 (MORBID OBESITY) (MULTI): Status: ACTIVE | Noted: 2022-02-23

## 2023-09-01 RX ORDER — METOPROLOL TARTRATE 50 MG/1
1.5 TABLET ORAL 2 TIMES DAILY
COMMUNITY
End: 2023-10-06 | Stop reason: ALTCHOICE

## 2023-09-01 RX ORDER — AMOXICILLIN 500 MG/1
1 CAPSULE ORAL EVERY 8 HOURS
COMMUNITY
Start: 2023-01-12 | End: 2023-11-14 | Stop reason: ALTCHOICE

## 2023-09-01 RX ORDER — METFORMIN HYDROCHLORIDE 500 MG/1
2 TABLET, EXTENDED RELEASE ORAL DAILY
COMMUNITY
End: 2023-11-14 | Stop reason: ALTCHOICE

## 2023-09-11 DIAGNOSIS — E11.9 TYPE 2 DIABETES MELLITUS WITHOUT COMPLICATION, UNSPECIFIED WHETHER LONG TERM INSULIN USE (MULTI): ICD-10-CM

## 2023-09-13 VITALS — BODY MASS INDEX: 41.16 KG/M2 | WEIGHT: 218 LBS | HEIGHT: 61 IN

## 2023-10-01 ENCOUNTER — TRANSCRIBE ORDERS (OUTPATIENT)
Dept: CARDIAC REHAB | Facility: CLINIC | Age: 53
End: 2023-10-01
Payer: COMMERCIAL

## 2023-10-01 DIAGNOSIS — Z95.5 STENTED CORONARY ARTERY: Primary | ICD-10-CM

## 2023-10-02 ENCOUNTER — CLINICAL SUPPORT (OUTPATIENT)
Dept: CARDIAC REHAB | Facility: CLINIC | Age: 53
End: 2023-10-02
Payer: COMMERCIAL

## 2023-10-02 DIAGNOSIS — Z95.5 STENTED CORONARY ARTERY: ICD-10-CM

## 2023-10-02 PROBLEM — M25.511 RIGHT SHOULDER PAIN: Status: ACTIVE | Noted: 2023-10-02

## 2023-10-02 PROCEDURE — 93798 PHYS/QHP OP CAR RHAB W/ECG: CPT | Performed by: INTERNAL MEDICINE

## 2023-10-02 NOTE — PROGRESS NOTES
"Physical Therapy Treatment    Patient Name: Turner Thakkar  MRN: [id number]  Today's Date: 10/3/2023  Time Calculation  Start Time: 1530  Stop Time: 1615  Time Calculation (min): 45 min    Insurance reviewed   Visit number: 9   [employer]  visits BMN  no auth required      Current Problem  1. Right shoulder pain, unspecified chronicity            Subjective   General     Precautions  Precautions  Precautions Comment: none  Pain  Pain Score: 0 - No pain    Objective   Treatments:   UBE 3/3  Wall slides 3\" x20  Seated ER stretch: 3x30\"  IR stretch w/ strap: 3x30\"  Stand cane Ext/IR 1# x20  Standing cane flex/abd 1# x20  TB rows/ext GTB x30  TB ER/IR RTB x30  Jobes 2# x20ea  S/L 4-way 2# x20 each    Assessment:   Pt. Progressing well w/ all exercises. Pt. Still having some increased pain/tightness at end ranges during ER/IR stretches. Pt. Could benefit from more skilled therapy for continuing to increase strength and ROM.     Plan:   Pt. Will discuss POC going forward at her RE-Check w/ her PT [facility].     OP EDUCATION:       Goals:     "

## 2023-10-03 ENCOUNTER — TREATMENT (OUTPATIENT)
Dept: PHYSICAL THERAPY | Facility: CLINIC | Age: 53
End: 2023-10-03
Payer: COMMERCIAL

## 2023-10-03 DIAGNOSIS — M25.511 RIGHT SHOULDER PAIN, UNSPECIFIED CHRONICITY: Primary | ICD-10-CM

## 2023-10-03 DIAGNOSIS — I25.10 CORONARY ARTERY DISEASE INVOLVING NATIVE HEART WITHOUT ANGINA PECTORIS, UNSPECIFIED VESSEL OR LESION TYPE: ICD-10-CM

## 2023-10-03 DIAGNOSIS — Z79.01 ANTICOAGULANT LONG-TERM USE: ICD-10-CM

## 2023-10-03 PROCEDURE — 97110 THERAPEUTIC EXERCISES: CPT | Mod: GP,CQ

## 2023-10-03 ASSESSMENT — PAIN SCALES - GENERAL: PAINLEVEL_OUTOF10: 0 - NO PAIN

## 2023-10-03 NOTE — TELEPHONE ENCOUNTER
PT WAS PRESCRIBED ELIQUIS 5MG BY THE ER, AND JUST RAN OUT OF REFILLS ON IT. SHE IS ASKING IF JE CAN REFILL THIS PLEASE. 1 IN THE AM ONE IN THE PM.  [facility] IN [place] ON [address]

## 2023-10-03 NOTE — Clinical Note
October 3, 2023     Patient: Turner Thakkar   YOB: 1970   Date of Visit: 10/3/2023       To Whom It May Concern:    It is my medical opinion that Turner Thakkar {Work release (duty restriction):23565}.    If you have any questions or concerns, please don't hesitate to call.         Sincerely,        Naman Doll, PTA    CC: No Recipients

## 2023-10-03 NOTE — Clinical Note
October 3, 2023     Patient: Turner Thakkar   YOB: 1970   Date of Visit: 10/3/2023       To Whom it May Concern:    Turner Thakkar was seen in my clinic on 10/3/2023. She {Return to school/sport:26180}.    If you have any questions or concerns, please don't hesitate to call.         Sincerely,          Naman Doll, PTA        CC: No Recipients

## 2023-10-04 ENCOUNTER — TRANSCRIBE ORDERS (OUTPATIENT)
Dept: CARDIAC REHAB | Facility: CLINIC | Age: 53
End: 2023-10-04
Payer: COMMERCIAL

## 2023-10-04 ENCOUNTER — APPOINTMENT (OUTPATIENT)
Dept: CARDIAC REHAB | Facility: CLINIC | Age: 53
End: 2023-10-04
Payer: COMMERCIAL

## 2023-10-04 ENCOUNTER — CLINICAL SUPPORT (OUTPATIENT)
Dept: CARDIAC REHAB | Facility: CLINIC | Age: 53
End: 2023-10-04
Payer: COMMERCIAL

## 2023-10-04 DIAGNOSIS — Z95.5 STENTED CORONARY ARTERY: Primary | ICD-10-CM

## 2023-10-04 DIAGNOSIS — Z95.5 STENTED CORONARY ARTERY: ICD-10-CM

## 2023-10-04 PROCEDURE — 93798 PHYS/QHP OP CAR RHAB W/ECG: CPT | Performed by: INTERNAL MEDICINE

## 2023-10-06 ENCOUNTER — APPOINTMENT (OUTPATIENT)
Dept: CARDIAC REHAB | Facility: CLINIC | Age: 53
End: 2023-10-06
Payer: COMMERCIAL

## 2023-10-06 ENCOUNTER — OFFICE VISIT (OUTPATIENT)
Dept: PRIMARY CARE | Facility: CLINIC | Age: 53
End: 2023-10-06
Payer: COMMERCIAL

## 2023-10-06 ENCOUNTER — CLINICAL SUPPORT (OUTPATIENT)
Dept: CARDIAC REHAB | Facility: CLINIC | Age: 53
End: 2023-10-06
Payer: COMMERCIAL

## 2023-10-06 VITALS
SYSTOLIC BLOOD PRESSURE: 128 MMHG | BODY MASS INDEX: 39.44 KG/M2 | HEART RATE: 74 BPM | DIASTOLIC BLOOD PRESSURE: 64 MMHG | HEIGHT: 64 IN | OXYGEN SATURATION: 98 % | WEIGHT: 231 LBS

## 2023-10-06 DIAGNOSIS — E11.9 TYPE 2 DIABETES MELLITUS WITHOUT COMPLICATION, UNSPECIFIED WHETHER LONG TERM INSULIN USE (MULTI): ICD-10-CM

## 2023-10-06 DIAGNOSIS — Z95.5 STENTED CORONARY ARTERY: ICD-10-CM

## 2023-10-06 DIAGNOSIS — M75.01 ADHESIVE CAPSULITIS OF RIGHT SHOULDER: ICD-10-CM

## 2023-10-06 DIAGNOSIS — I25.10 CORONARY ARTERY DISEASE INVOLVING NATIVE HEART WITHOUT ANGINA PECTORIS, UNSPECIFIED VESSEL OR LESION TYPE: ICD-10-CM

## 2023-10-06 DIAGNOSIS — I10 PRIMARY HYPERTENSION: ICD-10-CM

## 2023-10-06 PROCEDURE — 99214 OFFICE O/P EST MOD 30 MIN: CPT | Performed by: FAMILY MEDICINE

## 2023-10-06 PROCEDURE — 93798 PHYS/QHP OP CAR RHAB W/ECG: CPT | Performed by: INTERNAL MEDICINE

## 2023-10-06 NOTE — PROGRESS NOTES
Subjective   Patient ID: Turner Thakkar is a 53 y.o. female who presents for shoulder pain     HPI: Pt states her right shoulder and bicep area is doing better, she states PT is going to assess her next Tuesday.     12 Systems have been reviewed as follows.  Constitutional: Fever, weight gain, weight loss, appetite change, night sweats, fatigue, chills.  Eyes : blurry, double vision, vision, loss, tearing, redness, pain, sensitivity to light, glaucoma.  Ears: nose, mouth, and throat: Hearing loss, ringing in the ears, ear pain, nasal congestion, nasal drainage, nosebleeds, mouth, throat, irritation tooth problem.  Cardiovascular: chest pain, pressure, heart racing, palpitations, sweating, leg swelling, high or low blood pressure  Pulmonary: Cough, yellow or green sputum, blood and sputum, shortness of breath, wheezing  Gastrointestinal: Nausea, vomiting, diarrhea, constipation, pain, blood in stool, or vomitus, heartburn, difficulty swallowing  Genitourinary: incontinence, abnormal bleeding, abnormal discharge, urinary frequency, urinary hesitancy, pain, impotence sexual problem, infection, urinary retention  Musculoskeletal: Pain, stiffness, joint, redness or warmth, arthritis, back pain, weakness, muscle wasting, sprain or fracture  Neuro: Weight weakness, dizziness, change in voice, change in taste change in vision, change in hearing, loss, or change of sensation, trouble walking, balance problems coordination problems, shaking, speech problem  Endocrine: cold or heat intolerance, blood sugar problem, weight gain or loss missed periods hot flashes, sweats, change in body hair, change in libido, increased thirst, increased urination  Heme/lymph: Swelling, bleeding, problem anemia, bruising, enlarged lymph nodes  Allergic/immunologic: H. plus nasal drip, watery itchy eyes, nasal drainage, immunosuppressed  The above were reviewed and noted negative except as noted in HPI and Problem List.      Objective   /64  "(BP Location: Left arm, Patient Position: Sitting, BP Cuff Size: Adult)   Pulse 74   Ht 1.626 m (5' 4\")   Wt 105 kg (231 lb)   SpO2 98%   BMI 39.65 kg/m²     Physical Exam CONSTITUTIONAL- NAD, Pt is A & O x3, Vital signs reviewed per chart.  General Appearance- normal , good hygiene,talks easily  EYES-PERRLA conjunctiva and lids- normal  EARS/NOSE-TM's normal, head normocephalic and atraumatic, naso pharynx-no nasal discharge, no trismus,  oropharynx- normal without exudate  NECK- supple, FROM, without mass  thyroid- NT, normal size, no nodule noted  LYMPH- WNL  CV- RRR without murmur, gallop, or other abnormality.  PULM- CTA bilaterally  Respiratory effort- normal respiratory effort , no retractions or nasal flaring  ABDOMEN- normoactive BS's , soft , NT, no hepatosplenomegaly palpated, or masses. No pulsatile masses noted  extremities no edema,NT  SKIN- no abnormal skin lesions on inspection or palpation  neuro- no focal deficits  PSYCH- pleasant, normal judgement and insight     Assessment/Plan   Problem List Items Addressed This Visit             ICD-10-CM    Hypertension I10    CAD (coronary artery disease) I25.10    DM (diabetes mellitus) (CMS/HCC) E11.9     Other Visit Diagnoses         Codes    Adhesive capsulitis of right shoulder     M75.01             Scribe Attestation  By signing my name below, I, Camille Hicks MA  , Scribe   attest that this documentation has been prepared under the direction and in the presence of Jim Mcgill DO.  "

## 2023-10-09 ENCOUNTER — APPOINTMENT (OUTPATIENT)
Dept: CARDIOLOGY | Facility: CLINIC | Age: 53
End: 2023-10-09
Payer: COMMERCIAL

## 2023-10-09 ENCOUNTER — APPOINTMENT (OUTPATIENT)
Dept: CARDIAC REHAB | Facility: CLINIC | Age: 53
End: 2023-10-09
Payer: COMMERCIAL

## 2023-10-09 ENCOUNTER — CLINICAL SUPPORT (OUTPATIENT)
Dept: CARDIAC REHAB | Facility: CLINIC | Age: 53
End: 2023-10-09
Payer: COMMERCIAL

## 2023-10-09 DIAGNOSIS — Z95.5 STENTED CORONARY ARTERY: ICD-10-CM

## 2023-10-09 PROCEDURE — 93798 PHYS/QHP OP CAR RHAB W/ECG: CPT | Performed by: INTERNAL MEDICINE

## 2023-10-10 ENCOUNTER — TREATMENT (OUTPATIENT)
Dept: PHYSICAL THERAPY | Facility: CLINIC | Age: 53
End: 2023-10-10
Payer: COMMERCIAL

## 2023-10-10 DIAGNOSIS — M25.511 RIGHT SHOULDER PAIN, UNSPECIFIED CHRONICITY: Primary | ICD-10-CM

## 2023-10-10 PROCEDURE — 97110 THERAPEUTIC EXERCISES: CPT | Mod: GP

## 2023-10-10 ASSESSMENT — PAIN - FUNCTIONAL ASSESSMENT: PAIN_FUNCTIONAL_ASSESSMENT: 0-10

## 2023-10-10 ASSESSMENT — PAIN SCALES - GENERAL: PAINLEVEL_OUTOF10: 0 - NO PAIN

## 2023-10-10 NOTE — PROGRESS NOTES
"Physical Therapy Discharge Summary    Patient Name: Kathy Parks  MRN: [id number]    Current Problem  1. Right shoulder pain, unspecified chronicity          Insurance reviewed   Visit number: 10  [employer]  visits BMN  no auth required    Subjective   General  Pt reports feeling pretty good today. No c/o pain noted. Reports that she feels she is ready to be discharged from PT services today.  Precautions  None   Pain  0/10    Objective   Ortho   Posture: forward head, rounded shoulders  Scapula position: WNL  Scapulohumeral Rhythm: WNL    Cervical AROM (* indicates pain)  WNL all planes B    Shoulder PROM (* indicates pain)  Flexion: WNL L ; 180 R  ABD: WNL L ; 170 R   ER: WNL L ; 80 R   IR: WNL L : 75 R     Shoulder AROM (* indicates pain)  Flexion: WNL L ; 180 R  ABD: WNL L ; 170 R   Functional ER: C7 L ; R C7 R  Functional IR: L2 L : L2 R    Shoulder MMT (* indicates pain)  Flex: 5/5 L ; 5/5 R   ABD: 5/5 L ; 5/5 R   IR: 5/5 L ; 5/5 R   ER: 5/5 L ; 5/5 R     Palpation: no TTP  Treatments:  Includes measurements for discharge   UBE 3/3  Wall slides 3\" x20  Seated ER stretch: 3x30\"  IR stretch w/ strap: 3x30\"    Not today:  Stand cane Ext/IR 1# x20  Standing cane flex/abd 1# x20  TB rows/ext GTB x30  TB ER/IR RTB x30  Jobes 2# x20ea  S/L 4-way 2# x20 each  Outcome Measures:  Other Measures  Other Outcome Measures: 0% (QuickDASH)    Goals  By discharge KATHY PARKS will achieve the following goals:     Pt will report at least 75% improvement in R shoulder pain during everyday activities. GOAL MET  Pt will demo >/= 4+/5 strength of R shoulder musculature without pain. GOAL MET  Pt will demo full and symmetrical A/PROM of shoulder without pain. GOAL MOSTLY MET  Pt will perform all work specific duties and tasks without pain. GOAL MET  Pt will demo independence and report compliance with HEP. GOAL MET      Assessment   Patient is a 52 y/o F who has completed 10 PT visits for c/o R shoulder pain. Pt has made significant " progress towards all functional goals since starting PT. Pt has no c/o pain, demos improved R shoulder ROM and improved BUE strength, as well as impaired posture. Pt is able to perform all work duties/tasks w/out c/o pain or difficulty. Pt feels she is about 95% improved. Pt has no questions or concerns about discharge from PT services at this time w/ recommendation to continue w/ her exercises on her own at home. Will follow-up as needed.     Plan   Discharge patient, all/most significant goals met

## 2023-10-11 ENCOUNTER — APPOINTMENT (OUTPATIENT)
Dept: CARDIAC REHAB | Facility: CLINIC | Age: 53
End: 2023-10-11
Payer: COMMERCIAL

## 2023-10-11 ENCOUNTER — CLINICAL SUPPORT (OUTPATIENT)
Dept: CARDIAC REHAB | Facility: CLINIC | Age: 53
End: 2023-10-11
Payer: COMMERCIAL

## 2023-10-11 DIAGNOSIS — Z95.5 STENTED CORONARY ARTERY: ICD-10-CM

## 2023-10-11 PROCEDURE — 93798 PHYS/QHP OP CAR RHAB W/ECG: CPT

## 2023-10-13 ENCOUNTER — APPOINTMENT (OUTPATIENT)
Dept: CARDIAC REHAB | Facility: CLINIC | Age: 53
End: 2023-10-13
Payer: COMMERCIAL

## 2023-10-13 ENCOUNTER — CLINICAL SUPPORT (OUTPATIENT)
Dept: CARDIAC REHAB | Facility: CLINIC | Age: 53
End: 2023-10-13
Payer: COMMERCIAL

## 2023-10-13 DIAGNOSIS — Z95.5 STENTED CORONARY ARTERY: ICD-10-CM

## 2023-10-13 PROCEDURE — 93798 PHYS/QHP OP CAR RHAB W/ECG: CPT | Performed by: INTERNAL MEDICINE

## 2023-10-16 ENCOUNTER — APPOINTMENT (OUTPATIENT)
Dept: CARDIAC REHAB | Facility: CLINIC | Age: 53
End: 2023-10-16
Payer: COMMERCIAL

## 2023-10-16 ENCOUNTER — CLINICAL SUPPORT (OUTPATIENT)
Dept: CARDIAC REHAB | Facility: CLINIC | Age: 53
End: 2023-10-16
Payer: COMMERCIAL

## 2023-10-16 DIAGNOSIS — Z95.5 STENTED CORONARY ARTERY: ICD-10-CM

## 2023-10-16 PROCEDURE — 93798 PHYS/QHP OP CAR RHAB W/ECG: CPT | Performed by: INTERNAL MEDICINE

## 2023-10-17 ENCOUNTER — TELEPHONE (OUTPATIENT)
Dept: PRIMARY CARE | Facility: CLINIC | Age: 53
End: 2023-10-17

## 2023-10-17 ENCOUNTER — APPOINTMENT (OUTPATIENT)
Dept: PRIMARY CARE | Facility: CLINIC | Age: 53
End: 2023-10-17
Payer: COMMERCIAL

## 2023-10-17 NOTE — TELEPHONE ENCOUNTER
Hurt knee yesterday at work. In a lot of pain. Purchased knee brace. Wanting dr to order a xray to get knee looked at.    Please advise    [phone]

## 2023-10-18 ENCOUNTER — ANCILLARY PROCEDURE (OUTPATIENT)
Dept: RADIOLOGY | Facility: CLINIC | Age: 53
End: 2023-10-18
Payer: COMMERCIAL

## 2023-10-18 ENCOUNTER — APPOINTMENT (OUTPATIENT)
Dept: CARDIAC REHAB | Facility: CLINIC | Age: 53
End: 2023-10-18
Payer: COMMERCIAL

## 2023-10-18 DIAGNOSIS — M25.562 LEFT KNEE PAIN, UNSPECIFIED CHRONICITY: ICD-10-CM

## 2023-10-18 DIAGNOSIS — M25.562 LEFT KNEE PAIN, UNSPECIFIED CHRONICITY: Primary | ICD-10-CM

## 2023-10-18 PROCEDURE — 73562 X-RAY EXAM OF KNEE 3: CPT | Mod: LEFT SIDE | Performed by: RADIOLOGY

## 2023-10-18 PROCEDURE — 73562 X-RAY EXAM OF KNEE 3: CPT | Mod: LT,FY

## 2023-10-19 ENCOUNTER — APPOINTMENT (OUTPATIENT)
Dept: PRIMARY CARE | Facility: CLINIC | Age: 53
End: 2023-10-19
Payer: COMMERCIAL

## 2023-10-19 ENCOUNTER — OFFICE VISIT (OUTPATIENT)
Dept: PRIMARY CARE | Facility: CLINIC | Age: 53
End: 2023-10-19
Payer: COMMERCIAL

## 2023-10-19 VITALS
WEIGHT: 234 LBS | HEART RATE: 74 BPM | SYSTOLIC BLOOD PRESSURE: 126 MMHG | OXYGEN SATURATION: 98 % | HEIGHT: 61 IN | DIASTOLIC BLOOD PRESSURE: 70 MMHG | BODY MASS INDEX: 44.18 KG/M2

## 2023-10-19 DIAGNOSIS — Z95.5 STENTED CORONARY ARTERY: ICD-10-CM

## 2023-10-19 DIAGNOSIS — I10 PRIMARY HYPERTENSION: ICD-10-CM

## 2023-10-19 DIAGNOSIS — E11.9 CONTROLLED TYPE 2 DIABETES MELLITUS WITHOUT COMPLICATION, WITHOUT LONG-TERM CURRENT USE OF INSULIN (MULTI): ICD-10-CM

## 2023-10-19 DIAGNOSIS — M25.562 LEFT KNEE PAIN, UNSPECIFIED CHRONICITY: Primary | ICD-10-CM

## 2023-10-19 DIAGNOSIS — G47.33 OSA ON CPAP: ICD-10-CM

## 2023-10-19 DIAGNOSIS — M17.0 PRIMARY OSTEOARTHRITIS OF BOTH KNEES: ICD-10-CM

## 2023-10-19 PROBLEM — R07.9 CHEST PAIN: Status: RESOLVED | Noted: 2023-03-16 | Resolved: 2023-10-19

## 2023-10-19 PROBLEM — R07.89 CHEST PRESSURE: Status: RESOLVED | Noted: 2023-09-01 | Resolved: 2023-10-19

## 2023-10-19 PROBLEM — E66.813 CLASS 3 SEVERE OBESITY DUE TO EXCESS CALORIES WITH SERIOUS COMORBIDITY AND BODY MASS INDEX (BMI) OF 40.0 TO 44.9 IN ADULT: Status: RESOLVED | Noted: 2023-04-07 | Resolved: 2023-10-19

## 2023-10-19 PROBLEM — M67.432 GANGLION OF LEFT WRIST: Status: RESOLVED | Noted: 2023-03-16 | Resolved: 2023-10-19

## 2023-10-19 PROBLEM — E66.01 MORBID OBESITY WITH BMI OF 45.0-49.9, ADULT (MULTI): Status: RESOLVED | Noted: 2023-03-16 | Resolved: 2023-10-19

## 2023-10-19 PROBLEM — E66.01 CLASS 3 SEVERE OBESITY DUE TO EXCESS CALORIES WITH SERIOUS COMORBIDITY AND BODY MASS INDEX (BMI) OF 40.0 TO 44.9 IN ADULT (MULTI): Status: RESOLVED | Noted: 2023-04-07 | Resolved: 2023-10-19

## 2023-10-19 PROBLEM — R13.10 PAINFUL SWALLOWING: Status: RESOLVED | Noted: 2023-03-16 | Resolved: 2023-10-19

## 2023-10-19 PROBLEM — J02.9 SORE THROAT: Status: RESOLVED | Noted: 2023-03-16 | Resolved: 2023-10-19

## 2023-10-19 PROBLEM — M25.511 RIGHT SHOULDER PAIN: Status: RESOLVED | Noted: 2023-10-02 | Resolved: 2023-10-19

## 2023-10-19 PROCEDURE — 20610 DRAIN/INJ JOINT/BURSA W/O US: CPT | Performed by: FAMILY MEDICINE

## 2023-10-19 PROCEDURE — 99214 OFFICE O/P EST MOD 30 MIN: CPT | Performed by: FAMILY MEDICINE

## 2023-10-19 RX ADMIN — LIDOCAINE HYDROCHLORIDE 0.5 ML: 20 INJECTION, SOLUTION INFILTRATION; PERINEURAL at 11:21

## 2023-10-19 RX ADMIN — TRIAMCINOLONE ACETONIDE 2.5 MG: 40 INJECTION, SUSPENSION INTRA-ARTICULAR; INTRAMUSCULAR at 11:21

## 2023-10-19 ASSESSMENT — ENCOUNTER SYMPTOMS
RESPIRATORY NEGATIVE: 1
GASTROINTESTINAL NEGATIVE: 1
ENDOCRINE NEGATIVE: 1
MUSCULOSKELETAL NEGATIVE: 1
PSYCHIATRIC NEGATIVE: 1
NEUROLOGICAL NEGATIVE: 1
ALLERGIC/IMMUNOLOGIC NEGATIVE: 1
CONSTITUTIONAL NEGATIVE: 1
HEMATOLOGIC/LYMPHATIC NEGATIVE: 1
EYES NEGATIVE: 1
CARDIOVASCULAR NEGATIVE: 1

## 2023-10-19 NOTE — PROGRESS NOTES
Subjective   Patient ID: Turner Thakkar is a 53 y.o. female who presents for Knee Pain.    Knee Pain     Pt states she had X-rays of her left knee on 10/18/2023. Pt states she bent down and her a pop, did not cause pain. Pt states that pain gradually started getting worse. Pt states by 2 nights ago it was painful, used a heating pad which caused great relief. Pt states it is  but has not gotten any worse.     12 Systems have been reviewed as follows.  Constitutional: Fever, weight gain, weight loss, appetite change, night sweats, fatigue, chills.  Eyes : blurry, double vision, vision, loss, tearing, redness, pain, sensitivity to light, glaucoma.  Ears: nose, mouth, and throat: Hearing loss, ringing in the ears, ear pain, nasal congestion, nasal drainage, nosebleeds, mouth, throat, irritation tooth problem.  Cardiovascular: chest pain, pressure, heart racing, palpitations, sweating, leg swelling, high or low blood pressure  Pulmonary: Cough, yellow or green sputum, blood and sputum, shortness of breath, wheezing  Gastrointestinal: Nausea, vomiting, diarrhea, constipation, pain, blood in stool, or vomitus, heartburn, difficulty swallowing  Genitourinary: incontinence, abnormal bleeding, abnormal discharge, urinary frequency, urinary hesitancy, pain, impotence sexual problem, infection, urinary retention  Musculoskeletal: Pain, stiffness, joint, redness or warmth, arthritis, back pain, weakness, muscle wasting, sprain or fracture  Neuro: Weight weakness, dizziness, change in voice, change in taste change in vision, change in hearing, loss, or change of sensation, trouble walking, balance problems coordination problems, shaking, speech problem  Endocrine: cold or heat intolerance, blood sugar problem, weight gain or loss missed periods hot flashes, sweats, change in body hair, change in libido, increased thirst, increased urination  Heme/lymph: Swelling, bleeding, problem anemia, bruising, enlarged lymph  "nodes  Allergic/immunologic: H. plus nasal drip, watery itchy eyes, nasal drainage, immunosuppressed  The above were reviewed and noted negative except as noted in HPI and Problem List.      Objective   /70 (BP Location: Left arm, Patient Position: Sitting, BP Cuff Size: Adult)   Pulse 74   Ht 1.549 m (5' 1\")   Wt 106 kg (234 lb)   SpO2 98%   BMI 44.21 kg/m²     Physical Exam  CONSTITUTIONAL- NAD, Pt is A & O x3, Vital signs reviewed per chart.  General Appearance- normal , good hygiene,talks easily  EYES-PERRLA conjunctiva and lids- normal  EARS/NOSE-TM's normal, head normocephalic and atraumatic, naso pharynx-no nasal discharge, no trismus,  oropharynx- normal without exudate  NECK- supple, FROM, without mass  thyroid- NT, normal size, no nodule noted  LYMPH- WNL  CV- RRR without murmur, gallop, or other abnormality.  PULM- CTA bilaterally  Respiratory effort- normal respiratory effort , no retractions or nasal flaring  ABDOMEN- normoactive BS's , soft , NT, no hepatosplenomegaly palpated, or masses. No pulsatile masses noted  extremities no edema,NT  SKIN- no abnormal skin lesions on inspection or palpation  neuro- no focal deficits  PSYCH- pleasant, normal judgement and insight    Assessment/Plan   Problem List Items Addressed This Visit             ICD-10-CM    Hypertension I10    MAYELA on CPAP G47.33    Stented coronary artery Z95.5    OA (osteoarthritis) M19.90    Controlled type 2 diabetes mellitus without complication, without long-term current use of insulin (CMS/[facility]) E11.9     Other Visit Diagnoses         Codes    Left knee pain, unspecified chronicity    -  Primary M25.562             Scribe Attestation  By signing my name below, Angela BRANDT RMA, Scribe   attest that this documentation has been prepared under the direction and in the presence of Jim Mcgill DO.    Provider Attestation - Scribe documentation    All medical record entries made by the Scribe were at my direction and " "personally dictated by me. I have reviewed the chart and agree that the record accurately reflects my personal performance of the history, physical exam, discussion and plan.    Patient ID: Turner Thakkar is a 53 y.o. female.    Joint Injection Large/Arthrocentesis: L knee on 10/19/2023 11:21 AM  Indications: pain  Details: (27g 1.25\") needle, medial approach  Medications: 2.5 mg triamcinolone acetonide 40 mg/mL; 0.5 mL lidocaine 20 mg/mL (2 %)  Outcome: tolerated well, no immediate complications  Procedure, treatment alternatives, risks and benefits explained, specific risks discussed. Consent was given by the patient. Immediately prior to procedure a time out was called to verify the correct patient, procedure, equipment, support staff and site/side marked as required. Patient was prepped and draped in the usual sterile fashion.         "

## 2023-10-19 NOTE — PROGRESS NOTES
"Subjective   Patient ID: Turner Thakkar is a 53 y.o. female who presents for Knee Pain.    Knee Pain    Pt states she had X-rays of her left knee on 10/18/2024.Pt states it is .    Review of Systems   Constitutional: Negative.    HENT: Negative.     Eyes: Negative.    Respiratory: Negative.     Cardiovascular: Negative.    Gastrointestinal: Negative.    Endocrine: Negative.    Genitourinary: Negative.    Musculoskeletal: Negative.    Skin: Negative.    Allergic/Immunologic: Negative.    Neurological: Negative.    Hematological: Negative.    Psychiatric/Behavioral: Negative.         Objective   /70 (BP Location: Left arm, Patient Position: Sitting, BP Cuff Size: Adult)   Pulse 74   Ht 1.549 m (5' 1\")   Wt 106 kg (234 lb)   SpO2 98%   BMI 44.21 kg/m²     Physical Exam    Assessment/Plan          "

## 2023-10-20 ENCOUNTER — APPOINTMENT (OUTPATIENT)
Dept: CARDIAC REHAB | Facility: CLINIC | Age: 53
End: 2023-10-20
Payer: COMMERCIAL

## 2023-10-23 ENCOUNTER — APPOINTMENT (OUTPATIENT)
Dept: CARDIAC REHAB | Facility: CLINIC | Age: 53
End: 2023-10-23
Payer: COMMERCIAL

## 2023-10-23 PROBLEM — I48.91 ATRIAL FIBRILLATION WITH RVR (MULTI): Status: RESOLVED | Noted: 2023-03-16 | Resolved: 2023-10-23

## 2023-10-23 RX ORDER — LIDOCAINE HYDROCHLORIDE 20 MG/ML
0.5 INJECTION, SOLUTION INFILTRATION; PERINEURAL
Status: COMPLETED | OUTPATIENT
Start: 2023-10-19 | End: 2023-10-19

## 2023-10-23 RX ORDER — TRIAMCINOLONE ACETONIDE 40 MG/ML
2.5 INJECTION, SUSPENSION INTRA-ARTICULAR; INTRAMUSCULAR
Status: COMPLETED | OUTPATIENT
Start: 2023-10-19 | End: 2023-10-19

## 2023-10-24 DIAGNOSIS — I10 PRIMARY HYPERTENSION: ICD-10-CM

## 2023-10-24 RX ORDER — AMLODIPINE BESYLATE 5 MG/1
5 TABLET ORAL DAILY
Qty: 90 TABLET | Refills: 3 | Status: SHIPPED | OUTPATIENT
Start: 2023-10-24 | End: 2024-01-08 | Stop reason: SDUPTHER

## 2023-10-25 ENCOUNTER — CLINICAL SUPPORT (OUTPATIENT)
Dept: CARDIAC REHAB | Facility: CLINIC | Age: 53
End: 2023-10-25
Payer: COMMERCIAL

## 2023-10-25 ENCOUNTER — APPOINTMENT (OUTPATIENT)
Dept: CARDIAC REHAB | Facility: CLINIC | Age: 53
End: 2023-10-25
Payer: COMMERCIAL

## 2023-10-25 DIAGNOSIS — Z95.5 STENTED CORONARY ARTERY: ICD-10-CM

## 2023-10-25 PROCEDURE — 93798 PHYS/QHP OP CAR RHAB W/ECG: CPT | Performed by: INTERNAL MEDICINE

## 2023-10-26 ENCOUNTER — CLINICAL SUPPORT (OUTPATIENT)
Dept: CARDIOLOGY | Facility: CLINIC | Age: 53
End: 2023-10-26
Payer: COMMERCIAL

## 2023-10-26 DIAGNOSIS — I73.9 CLAUDICATION (CMS-HCC): ICD-10-CM

## 2023-10-26 PROCEDURE — 93922 UPR/L XTREMITY ART 2 LEVELS: CPT | Performed by: INTERNAL MEDICINE

## 2023-10-26 PROCEDURE — 93922 UPR/L XTREMITY ART 2 LEVELS: CPT

## 2023-10-27 ENCOUNTER — CLINICAL SUPPORT (OUTPATIENT)
Dept: CARDIAC REHAB | Facility: CLINIC | Age: 53
End: 2023-10-27
Payer: COMMERCIAL

## 2023-10-27 ENCOUNTER — APPOINTMENT (OUTPATIENT)
Dept: CARDIAC REHAB | Facility: CLINIC | Age: 53
End: 2023-10-27
Payer: COMMERCIAL

## 2023-10-27 DIAGNOSIS — Z95.5 STENTED CORONARY ARTERY: ICD-10-CM

## 2023-10-27 PROCEDURE — 93798 PHYS/QHP OP CAR RHAB W/ECG: CPT | Performed by: INTERNAL MEDICINE

## 2023-10-30 ENCOUNTER — CLINICAL SUPPORT (OUTPATIENT)
Dept: CARDIAC REHAB | Facility: CLINIC | Age: 53
End: 2023-10-30
Payer: COMMERCIAL

## 2023-10-30 DIAGNOSIS — Z95.5 STENTED CORONARY ARTERY: ICD-10-CM

## 2023-10-30 PROCEDURE — 93798 PHYS/QHP OP CAR RHAB W/ECG: CPT | Performed by: INTERNAL MEDICINE

## 2023-11-01 ENCOUNTER — CLINICAL SUPPORT (OUTPATIENT)
Dept: CARDIAC REHAB | Facility: CLINIC | Age: 53
End: 2023-11-01
Payer: COMMERCIAL

## 2023-11-01 DIAGNOSIS — Z95.5 STENTED CORONARY ARTERY: ICD-10-CM

## 2023-11-01 PROCEDURE — 93798 PHYS/QHP OP CAR RHAB W/ECG: CPT | Performed by: INTERNAL MEDICINE

## 2023-11-03 ENCOUNTER — TELEMEDICINE (OUTPATIENT)
Dept: VASCULAR SURGERY | Facility: CLINIC | Age: 53
End: 2023-11-03
Payer: COMMERCIAL

## 2023-11-03 DIAGNOSIS — I73.9 PAD (PERIPHERAL ARTERY DISEASE) (CMS-HCC): Primary | ICD-10-CM

## 2023-11-03 PROCEDURE — 99213 OFFICE O/P EST LOW 20 MIN: CPT | Performed by: SURGERY

## 2023-11-03 NOTE — PROGRESS NOTES
Vascular Surgery Clinic Note    CC: claudication    HPI:  Turner Thakkar is 53 y.o. female with history of right calf claudication and mild PAD. AKBAR is stable around 0.8 on the right, and normal on the left. She completed SET and improved significantly. She has no significant complaints.     Medical History:   has a past medical history of Acute nasopharyngitis (common cold) (10/11/2019), Acute nasopharyngitis (common cold) (10/11/2019), Body mass index (BMI) 45.0-49.9, adult (CMS/[facility]) (11/09/2021), Chronic ethmoidal sinusitis (09/08/2020), Chronic ethmoidal sinusitis (10/20/2020), Chronic rhinitis, COVID-19 (01/14/2022), Essential (primary) hypertension, Hypertrophy of nasal turbinates (10/20/2020), Morbid (severe) obesity due to excess calories (CMS/[facility]) (01/14/2022), Morbid (severe) obesity due to excess calories (CMS/[facility]), Nasal congestion (08/08/2020), Other abnormal glucose (10/07/2021), Other specified disorders of nose and nasal sinuses (09/08/2020), Personal history of nicotine dependence (02/10/2020), Personal history of other diseases of the circulatory system (01/19/2022), Personal history of other diseases of the nervous system and sense organs (11/12/2020), Personal history of other diseases of the respiratory system (08/06/2020), Personal history of other diseases of the respiratory system (09/08/2020), Personal history of other diseases of the respiratory system (01/27/2020), Personal history of other diseases of the respiratory system (03/18/2020), Personal history of other diseases of the respiratory system (04/01/2020), Personal history of other endocrine, nutritional and metabolic disease (12/28/2021), Personal history of other infectious and parasitic diseases (08/10/2021), Personal history of other infectious and parasitic diseases (04/01/2020), Personal history of other infectious and parasitic diseases (04/01/2020), Personal history of other infectious and parasitic diseases (10/15/2019),  Personal history of other specified conditions (12/28/2021), Personal history of other specified conditions (08/06/2020), Personal history of other specified conditions (09/08/2020), Personal history of other specified conditions (02/18/2022), Personal history of other specified conditions (01/25/2020), Personal history of other specified conditions (06/23/2020), Personal history of other specified conditions (01/31/2020), Personal history of pneumonia (recurrent) (12/22/2021), Polyp of colon, Rash and other nonspecific skin eruption (08/10/2021), Slurred speech, Spontaneous ecchymoses (05/11/2021), and Wheezing (01/25/2020).    Meds:   Current Outpatient Medications on File Prior to Visit   Medication Sig Dispense Refill    amLODIPine (Norvasc) 5 mg tablet TAKE 1 TABLET DAILY 90 tablet 3    amoxicillin (Amoxil) 500 mg capsule Take 1 capsule (500 mg) by mouth every 8 hours. UNTIL GONE      apixaban (Eliquis) 5 mg tablet Take 1 tablet (5 mg) by mouth 2 times a day. 60 tablet 3    ascorbic acid (Vitamin C) 500 mg ER capsule Take 1 capsule (500 mg) by mouth once daily.      biotin 5 mg capsule Take 1 capsule (5 mg) by mouth.      cholecalciferol (Vitamin D-3) 25 MCG (1000 UT) tablet Take 1 tablet (25 mcg) by mouth once daily.      chromium picolinate 1,000 mcg tablet Take 1 tablet by mouth once daily.      clopidogrel (Plavix) 75 mg tablet Take 1 tablet (75 mg) by mouth once daily.      dofetilide (Tikosyn) 125 mcg capsule Take 1 capsule (125 mcg) by mouth every 12 hours. 180 capsule 3    dofetilide (Tikosyn) 125 mcg capsule Take 1 capsule (125 mcg) by mouth every 12 hours. (Patient not taking: Reported on 10/6/2023) 60 capsule 3    econazole nitrate 1 % cream Apply and gently massage into affected area(s) twice daily 85 g 1    ezetimibe (Zetia) 10 mg tablet Take 1 tablet (10 mg) by mouth once daily. 90 tablet 3    fluticasone (Flonase) 50 mcg/actuation nasal spray Administer 1 spray into each nostril once daily. PRN       L. acidophilus/Bifid. animalis 15.5 billion cell capsule Take by mouth.      Lactobacillus acidophilus (PROBIOTIC ORAL) Take 1 capsule by mouth once daily.      lisinopril 20 mg tablet Take 2 tablets (40 mg) by mouth 2 times a day.      magnesium oxide (Mag-Ox) 400 mg tablet Take 2 tablets (800 mg) by mouth once daily.      metFORMIN  mg 24 hr tablet Take 2 tablets (1,000 mg) by mouth once daily.      methocarbamol (Robaxin) 500 mg tablet Take 1 tablet (500 mg) by mouth 4 times a day as needed for muscle spasms. 40 tablet 0    metoprolol succinate XL (Toprol-XL) 50 mg 24 hr tablet Take 1 tablet (50 mg) by mouth once daily at bedtime. 90 tablet 3    nitroglycerin (Nitrostat) 0.4 mg SL tablet Place 1 tablet (0.4 mg) under the tongue every 5 minutes if needed for chest pain.      norethindrone (Aygestin) 5 mg tablet Take 1 tablet (5 mg) by mouth once daily.      semaglutide (Rybelsus) 14 mg tablet tablet Take 1 tablet (14 mg) by mouth once daily. 30 tablet 3    zinc gluconate 50 mg tablet Take 1 tablet (50 mg of elemental zinc) by mouth once daily.       No current facility-administered medications on file prior to visit.        Allergies:   Allergies   Allergen Reactions    Percocet [Oxycodone-Acetaminophen] Palpitations     palpitations       SH:    Social Determinants of Health     Tobacco Use: Medium Risk (10/19/2023)    Patient History     Smoking Tobacco Use: Former     Smokeless Tobacco Use: Never     Passive Exposure: Not on file   Alcohol Use: Not on file   Financial Resource Strain: Not on file   Food Insecurity: Not on file   Transportation Needs: Not on file   Physical Activity: Not on file   Stress: Not on file   Social Connections: Not on file   Intimate Partner Violence: Not on file   Depression: Not at risk (7/13/2023)    PHQ-2     PHQ-2 Score: 0   Housing Stability: Not on file   Utilities: Not on file   Digital Equity: Not on file        FH:  Family History   Problem Relation Name Age of  Onset    Diabetes Mother      Hypertension Mother      Other (presence of stent in coronary artery) Mother      Other (presence of stent in coronary artery) Father      Other (CABG) Father      Diabetes Father          ROS:  All systems were reviewed and are negative except as per HPI.    Objective:  Vitals:  There were no vitals filed for this visit.     Assessment & Plan:  Turner Thakkar is 53 y.o. female with mild PAD. Rtc as needed. No intervention is warranted other than continued exercise and medical therapy.      I spent a total of 20 minutes on the day of the visit.         Faustino Rivera M.D.

## 2023-11-14 ENCOUNTER — OFFICE VISIT (OUTPATIENT)
Dept: PRIMARY CARE | Facility: CLINIC | Age: 53
End: 2023-11-14
Payer: COMMERCIAL

## 2023-11-14 VITALS
DIASTOLIC BLOOD PRESSURE: 88 MMHG | BODY MASS INDEX: 44.1 KG/M2 | OXYGEN SATURATION: 98 % | SYSTOLIC BLOOD PRESSURE: 144 MMHG | RESPIRATION RATE: 16 BRPM | HEART RATE: 78 BPM | HEIGHT: 61 IN | WEIGHT: 233.6 LBS | TEMPERATURE: 97.5 F

## 2023-11-14 DIAGNOSIS — J40 BRONCHITIS: Primary | ICD-10-CM

## 2023-11-14 DIAGNOSIS — R05.1 ACUTE COUGH: ICD-10-CM

## 2023-11-14 PROCEDURE — 99213 OFFICE O/P EST LOW 20 MIN: CPT | Performed by: NURSE PRACTITIONER

## 2023-11-14 RX ORDER — LISINOPRIL 40 MG/1
40 TABLET ORAL DAILY
COMMUNITY
Start: 2023-11-08

## 2023-11-14 RX ORDER — ALBUTEROL SULFATE 90 UG/1
2 AEROSOL, METERED RESPIRATORY (INHALATION) EVERY 4 HOURS PRN
Qty: 6.7 G | Refills: 0 | Status: SHIPPED | OUTPATIENT
Start: 2023-11-14 | End: 2024-11-13

## 2023-11-14 RX ORDER — CEFDINIR 300 MG/1
300 CAPSULE ORAL 2 TIMES DAILY
Qty: 20 CAPSULE | Refills: 0 | Status: SHIPPED | OUTPATIENT
Start: 2023-11-14 | End: 2023-11-24

## 2023-11-14 ASSESSMENT — ENCOUNTER SYMPTOMS
SHORTNESS OF BREATH: 0
DEPRESSION: 0
COUGH: 1
LOSS OF SENSATION IN FEET: 0
VOMITING: 0
PALPITATIONS: 0
CHILLS: 0
HEADACHES: 0
SINUS PAIN: 0
WEAKNESS: 0
OCCASIONAL FEELINGS OF UNSTEADINESS: 0
WHEEZING: 1
MYALGIAS: 0
SORE THROAT: 0
ABDOMINAL PAIN: 0
FEVER: 0
DIARRHEA: 0
DIZZINESS: 0

## 2023-11-14 ASSESSMENT — PATIENT HEALTH QUESTIONNAIRE - PHQ9
1. LITTLE INTEREST OR PLEASURE IN DOING THINGS: NOT AT ALL
SUM OF ALL RESPONSES TO PHQ9 QUESTIONS 1 AND 2: 0
2. FEELING DOWN, DEPRESSED OR HOPELESS: NOT AT ALL

## 2023-11-14 NOTE — PROGRESS NOTES
"Subjective   Patient ID: Turner Thakkar is a 53 y.o. female who presents for Bronchitis.    Patient is in office with bronchitis. Symptoms started on Sunday 11/12/2023 runny nose wheezing at night and a cough . Patient tested for COVID and it was NEG 11/12/2023. Patient did not take otc meds .       53-year-old female presents today complaining of a productive cough, nasal congestion and sneezing that have been worsening over the last few days.  She does feel that her symptoms are worse in the morning and at night.  She denies any fever or chills.  No chest pain or trouble breathing.  She denies smoking or vaping.  No history of asthma.  She denies any ill contacts.  She has not tried taking anything over-the-counter for symptom relief.    Review of Systems   Constitutional:  Negative for chills and fever.   HENT:  Positive for congestion and sneezing. Negative for ear pain, sinus pain and sore throat.    Respiratory:  Positive for cough and wheezing. Negative for shortness of breath.    Cardiovascular:  Negative for chest pain and palpitations.   Gastrointestinal:  Negative for abdominal pain, diarrhea and vomiting.   Musculoskeletal:  Negative for myalgias.   Neurological:  Negative for dizziness, weakness and headaches.       Objective   /88   Pulse 78   Temp 36.4 °C (97.5 °F) (Temporal)   Resp 16   Ht 1.549 m (5' 1\")   Wt 106 kg (233 lb 9.6 oz)   SpO2 98%   BMI 44.14 kg/m²     Physical Exam  Vitals and nursing note reviewed.   Constitutional:       General: She is not in acute distress.     Appearance: Normal appearance. She is obese.   HENT:      Right Ear: Tympanic membrane normal.      Left Ear: Tympanic membrane normal.      Nose: Congestion present.      Mouth/Throat:      Pharynx: No oropharyngeal exudate or posterior oropharyngeal erythema.   Eyes:      Conjunctiva/sclera: Conjunctivae normal.   Cardiovascular:      Rate and Rhythm: Normal rate and regular rhythm.      Heart sounds: Normal heart " sounds.   Pulmonary:      Effort: Pulmonary effort is normal. No respiratory distress.      Breath sounds: Wheezing present. No rhonchi or rales.      Comments: Mild wheezing noted  Lymphadenopathy:      Cervical: No cervical adenopathy.   Skin:     General: Skin is warm and dry.   Neurological:      Mental Status: She is alert.   Psychiatric:         Mood and Affect: Mood normal.         Behavior: Behavior normal.         Assessment/Plan   Problem List Items Addressed This Visit    None  Visit Diagnoses         Codes    Bronchitis    -  Primary J40    Relevant Medications    cefdinir (Omnicef) 300 mg capsule    Acute cough     R05.1    Relevant Medications    albuterol (Proventil HFA) 90 mcg/actuation inhaler        Bronchitis: Will treat with cefdinir.  Albuterol as needed for cough/wheezing/shortness of breath.  Follow-up with PCP in 1 week with any persisting symptoms, or sooner with any additional concerns.

## 2023-11-14 NOTE — PATIENT INSTRUCTIONS
Today you were seen for bronchitis.  Start antibiotics as directed and take until gone.  You have been prescribed an albuterol inhaler to use as needed.  Follow-up with your primary care provider in 1 week with any persisting symptoms, or sooner with any additional concerns.  If developing any chest pain, trouble breathing, bluish color around the lips, confusion or lethargy, please go to emergency department for further evaluation or call 911.

## 2023-12-31 DIAGNOSIS — E11.9 TYPE 2 DIABETES MELLITUS WITHOUT COMPLICATION, UNSPECIFIED WHETHER LONG TERM INSULIN USE (MULTI): ICD-10-CM

## 2024-01-02 RX ORDER — ORAL SEMAGLUTIDE 14 MG/1
14 TABLET ORAL DAILY
Qty: 30 TABLET | Refills: 3 | Status: SHIPPED | OUTPATIENT
Start: 2024-01-02 | End: 2024-04-29

## 2024-01-05 ENCOUNTER — OFFICE VISIT (OUTPATIENT)
Dept: PRIMARY CARE | Facility: CLINIC | Age: 54
End: 2024-01-05
Payer: COMMERCIAL

## 2024-01-05 VITALS
BODY MASS INDEX: 44.56 KG/M2 | WEIGHT: 236 LBS | DIASTOLIC BLOOD PRESSURE: 70 MMHG | OXYGEN SATURATION: 98 % | HEIGHT: 61 IN | SYSTOLIC BLOOD PRESSURE: 138 MMHG | HEART RATE: 78 BPM

## 2024-01-05 DIAGNOSIS — Z95.5 STENTED CORONARY ARTERY: ICD-10-CM

## 2024-01-05 DIAGNOSIS — I10 PRIMARY HYPERTENSION: ICD-10-CM

## 2024-01-05 DIAGNOSIS — E11.9 CONTROLLED TYPE 2 DIABETES MELLITUS WITHOUT COMPLICATION, WITHOUT LONG-TERM CURRENT USE OF INSULIN (MULTI): ICD-10-CM

## 2024-01-05 DIAGNOSIS — M17.0 PRIMARY OSTEOARTHRITIS OF BOTH KNEES: Primary | ICD-10-CM

## 2024-01-05 DIAGNOSIS — I25.10 CORONARY ARTERY DISEASE INVOLVING NATIVE HEART WITHOUT ANGINA PECTORIS, UNSPECIFIED VESSEL OR LESION TYPE: ICD-10-CM

## 2024-01-05 DIAGNOSIS — G47.33 OSA ON CPAP: ICD-10-CM

## 2024-01-05 PROCEDURE — 99214 OFFICE O/P EST MOD 30 MIN: CPT | Performed by: FAMILY MEDICINE

## 2024-01-05 NOTE — PROGRESS NOTES
Subjective   Patient ID: Turner Thakkar is a 53 y.o. female who presents for Knee Problem.    HPI   Pt presents today for follow up on left knee pain. Pt had kenalog injection in her left knee at last visit and states it is doing much better. Pt states she is having no issues walking up stairs. Pt states her right knee started the same pain as the left, she iced it and it helped and is back to normal.     Review of Systems  12 Systems have been reviewed as follows.  Constitutional: Fever, weight gain, weight loss, appetite change, night sweats, fatigue, chills.  Eyes : blurry, double vision, vision, loss, tearing, redness, pain, sensitivity to light, glaucoma.  Ears: nose, mouth, and throat: Hearing loss, ringing in the ears, ear pain, nasal congestion, nasal drainage, nosebleeds, mouth, throat, irritation tooth problem.  Cardiovascular: chest pain, pressure, heart racing, palpitations, sweating, leg swelling, high or low blood pressure  Pulmonary: Cough, yellow or green sputum, blood and sputum, shortness of breath, wheezing  Gastrointestinal: Nausea, vomiting, diarrhea, constipation, pain, blood in stool, or vomitus, heartburn, difficulty swallowing  Genitourinary: incontinence, abnormal bleeding, abnormal discharge, urinary frequency, urinary hesitancy, pain, impotence sexual problem, infection, urinary retention  Musculoskeletal: Pain, stiffness, joint, redness or warmth, arthritis, back pain, weakness, muscle wasting, sprain or fracture  Neuro: Weight weakness, dizziness, change in voice, change in taste change in vision, change in hearing, loss, or change of sensation, trouble walking, balance problems coordination problems, shaking, speech problem  Endocrine: cold or heat intolerance, blood sugar problem, weight gain or loss missed periods hot flashes, sweats, change in body hair, change in libido, increased thirst, increased urination  Heme/lymph: Swelling, bleeding, problem anemia, bruising, enlarged lymph  "nodes  Allergic/immunologic: H. plus nasal drip, watery itchy eyes, nasal drainage, immunosuppressed  The above were reviewed and noted negative except as noted in HPI and Problem List.    Objective   /70 (BP Location: Left arm, Patient Position: Sitting, BP Cuff Size: Adult)   Pulse 78   Ht 1.549 m (5' 1\")   Wt 107 kg (236 lb)   SpO2 98%   BMI 44.59 kg/m²     Physical Exam  CONSTITUTIONAL- NAD, Pt is A & O x3, Vital signs reviewed per chart.  General Appearance- normal , good hygiene,talks easily  EYES-PERRLA conjunctiva and lids- normal  EARS/NOSE-TM's normal, head normocephalic and atraumatic, naso pharynx-no nasal discharge, no trismus,  oropharynx- normal without exudate  NECK- supple, FROM, without mass  thyroid- NT, normal size, no nodule noted  LYMPH- WNL  CV- RRR without murmur, gallop, or other abnormality.  PULM- CTA bilaterally  Respiratory effort- normal respiratory effort , no retractions or nasal flaring  ABDOMEN- normoactive BS's , soft , NT, no hepatosplenomegaly palpated, or masses. No pulsatile masses noted  extremities no edema,NT  SKIN- no abnormal skin lesions on inspection or palpation  neuro- no focal deficits  PSYCH- pleasant, normal judgement and insight    Assessment/Plan   Problem List Items Addressed This Visit             ICD-10-CM    Hypertension I10    MAYELA on CPAP G47.33    Stented coronary artery Z95.5    OA (osteoarthritis) - Primary M19.90    Controlled type 2 diabetes mellitus without complication, without long-term current use of insulin (CMS/[facility]) E11.9     Scribe Attestation  By signing my name below, I, PONCHO Solorzano , [place]   attest that this documentation has been prepared under the direction and in the presence of Jim Mcgill DO.    Provider Attestation - Scribe documentation    All medical record entries made by the Scribe were at my direction and personally dictated by me. I have reviewed the chart and agree that the record accurately reflects my " personal performance of the history, physical exam, discussion and plan.

## 2024-01-06 RX ORDER — CLOPIDOGREL BISULFATE 75 MG/1
75 TABLET ORAL DAILY
Qty: 90 TABLET | Refills: 3 | Status: SHIPPED | OUTPATIENT
Start: 2024-01-06

## 2024-01-08 ENCOUNTER — OFFICE VISIT (OUTPATIENT)
Dept: CARDIOLOGY | Facility: CLINIC | Age: 54
End: 2024-01-08
Payer: COMMERCIAL

## 2024-01-08 VITALS
HEIGHT: 61 IN | BODY MASS INDEX: 45.33 KG/M2 | DIASTOLIC BLOOD PRESSURE: 72 MMHG | HEART RATE: 72 BPM | WEIGHT: 240.1 LBS | SYSTOLIC BLOOD PRESSURE: 144 MMHG

## 2024-01-08 DIAGNOSIS — I25.10 CORONARY ARTERY DISEASE INVOLVING NATIVE HEART WITHOUT ANGINA PECTORIS, UNSPECIFIED VESSEL OR LESION TYPE: Primary | ICD-10-CM

## 2024-01-08 DIAGNOSIS — E78.2 MIXED HYPERLIPIDEMIA: ICD-10-CM

## 2024-01-08 DIAGNOSIS — R06.02 SOBOE (SHORTNESS OF BREATH ON EXERTION): ICD-10-CM

## 2024-01-08 DIAGNOSIS — Z95.5 STENTED CORONARY ARTERY: ICD-10-CM

## 2024-01-08 DIAGNOSIS — Z79.01 ANTICOAGULANT LONG-TERM USE: ICD-10-CM

## 2024-01-08 DIAGNOSIS — I10 PRIMARY HYPERTENSION: ICD-10-CM

## 2024-01-08 DIAGNOSIS — I49.3 PVC (PREMATURE VENTRICULAR CONTRACTION): ICD-10-CM

## 2024-01-08 DIAGNOSIS — Z87.891 FORMER SMOKER: ICD-10-CM

## 2024-01-08 DIAGNOSIS — R00.2 PALPITATIONS: ICD-10-CM

## 2024-01-08 DIAGNOSIS — E66.01 OBESITY, CLASS III, BMI 40-49.9 (MORBID OBESITY) (MULTI): ICD-10-CM

## 2024-01-08 DIAGNOSIS — E11.9 TYPE 2 DIABETES MELLITUS WITHOUT COMPLICATION, UNSPECIFIED WHETHER LONG TERM INSULIN USE (MULTI): ICD-10-CM

## 2024-01-08 DIAGNOSIS — G47.33 OSA ON CPAP: ICD-10-CM

## 2024-01-08 PROCEDURE — 3078F DIAST BP <80 MM HG: CPT | Performed by: INTERNAL MEDICINE

## 2024-01-08 PROCEDURE — 3077F SYST BP >= 140 MM HG: CPT | Performed by: INTERNAL MEDICINE

## 2024-01-08 PROCEDURE — 1036F TOBACCO NON-USER: CPT | Performed by: INTERNAL MEDICINE

## 2024-01-08 PROCEDURE — 3008F BODY MASS INDEX DOCD: CPT | Performed by: INTERNAL MEDICINE

## 2024-01-08 PROCEDURE — 99214 OFFICE O/P EST MOD 30 MIN: CPT | Performed by: INTERNAL MEDICINE

## 2024-01-08 PROCEDURE — 4010F ACE/ARB THERAPY RXD/TAKEN: CPT | Performed by: INTERNAL MEDICINE

## 2024-01-08 RX ORDER — AMLODIPINE BESYLATE 10 MG/1
10 TABLET ORAL DAILY
Qty: 90 TABLET | Refills: 3 | Status: SHIPPED | OUTPATIENT
Start: 2024-01-08

## 2024-01-08 NOTE — PATIENT INSTRUCTIONS
Continue same medications/treatment.  Patient educated on proper medication use.  Patient educated on risk factor modification.  Please bring any lab results from other providers/physicians to your next appointment.    Please bring all medicines, vitamins, and herbal supplements with you when you come to the office.    Prescriptions will not be filled unless you are compliant with your follow up appointments or have a follow up appointment scheduled as per instruction of your physician. Refills should be requested at the time of your visit.    SCHEDULE ECHO and stress test in Feb 2024  INCREASE amlodipine to 10mg daily  Follow up with Dr. Urrutia in April/May 2024

## 2024-01-08 NOTE — PROGRESS NOTES
Patient:  Turner Thakkar  YOB: 1970  MRN: [id number]       Chief Complaint/Active Symptoms:     4 month follow up  Turner Thakkar is a 53 y.o. female who returns today for cardiac follow-up.  Patient has a history of PCI and stenting about a year ago.  She is obese.  She has hypertension.  She says she is feeling reasonably well.  She also has history of atrial fibrillation and is on anticoagulation and at that arrhythmic therapy in our EP office.      Objective:     Vitals:    01/08/24 1236   BP: 144/72   Pulse: 72       Vitals:    01/08/24 1236   Weight: 109 kg (240 lb 1.6 oz)       Allergies:     Allergies   Allergen Reactions    Oxycodone-Acetaminophen Palpitations     palpitations          Medications:     Current Outpatient Medications   Medication Instructions    albuterol (Proventil HFA) 90 mcg/actuation inhaler 2 puffs, inhalation, Every 4 hours PRN    amLODIPine (NORVASC) 5 mg, oral, Daily    apixaban (ELIQUIS) 5 mg, oral, 2 times daily    ascorbic acid (VITAMIN C) 500 mg, oral, Daily    biotin 5 mg, oral    cholecalciferol (VITAMIN D-3) 25 mcg, oral, Daily    chromium picolinate 1,000 mcg tablet 1 tablet, oral, Daily    clopidogrel (PLAVIX) 75 mg, oral, Daily    dofetilide (TIKOSYN) 125 mcg, oral, Every 12 hours    dofetilide (TIKOSYN) 125 mcg, oral, Every 12 hours    econazole nitrate 1 % cream 1 Application, 2 times daily, Apply and gentaly massage into affected area(s) twice daily    ezetimibe (ZETIA) 10 mg, oral, Daily    fluticasone (Flonase) 50 mcg/actuation nasal spray 1 spray, Each Nostril, Daily, PRN    Lactobacillus acidophilus (PROBIOTIC ORAL) 1 capsule, oral, Daily    lisinopril 40 mg tablet Take 1 tablet (40 mg) by mouth once daily.    lisinopril 40 mg, oral, 2 times daily    magnesium oxide (MAG-OX) 800 mg, oral, Daily    methocarbamol (ROBAXIN) 500 mg, oral, 4 times daily PRN    metoprolol succinate XL (TOPROL-XL) 50 mg, oral, Nightly    nitroglycerin (NITROSTAT) 0.4 mg, sublingual,  Every 5 min PRN    norethindrone (AYGESTIN) 5 mg, oral, Daily    Rybelsus 14 mg, oral, Daily    zinc gluconate 50 mg tablet 50 mg of elemental zinc, oral, Daily       Physical Examination:   Constitutional:       General: Awake.      Appearance: Normal and healthy appearance. Well-developed and not in distress.   Neck:      Vascular: No JVR. JVD normal.   Pulmonary:      Effort: Pulmonary effort is normal.      Breath sounds: Normal breath sounds. No wheezing. No rhonchi. No rales.   Chest:      Chest wall: Not tender to palpatation.   Cardiovascular:      PMI at left midclavicular line. Normal rate. Regular rhythm. Normal S1. Normal S2.       Murmurs: There is no murmur.      No gallop.  No click. No rub.   Pulses:     Intact distal pulses.   Edema:     Peripheral edema absent.   Abdominal:      Tenderness: There is no abdominal tenderness.   Musculoskeletal: Normal range of motion.         General: No tenderness. Skin:     General: Skin is warm and dry.   Neurological:      General: No focal deficit present.      Mental Status: Alert and oriented to person, place and time.            Lab:     CBC:   Lab Results   Component Value Date    WBC 8.4 08/14/2023    RBC 4.94 08/14/2023    HGB 15.2 08/14/2023    HCT 44.7 08/14/2023     08/14/2023        CMP:    Lab Results   Component Value Date     08/14/2023    K 3.6 08/14/2023     08/14/2023    CO2 27 08/14/2023    BUN 12 08/14/2023    CREATININE 0.73 08/14/2023    GLUCOSE 96 08/14/2023    CALCIUM 9.9 08/14/2023       Magnesium:    Lab Results   Component Value Date    MG 2.23 08/14/2023       Lipid Profile:    Lab Results   Component Value Date    TRIG 123 06/24/2023    HDL 44.1 06/24/2023       TSH:    Lab Results   Component Value Date    TSH 1.15 08/14/2023       BNP:   Lab Results   Component Value Date    BNP 18 08/14/2023        PT/INR:    Lab Results   Component Value Date    PROTIME 12.6 08/14/2023    INR 1.1 08/14/2023       HgBA1c:    Lab  Results   Component Value Date    HGBA1C 5.4 06/24/2023       BMP:  Lab Results   Component Value Date     08/14/2023     02/05/2023     02/03/2023    K 3.6 08/14/2023    K 4.1 02/05/2023    K 4.1 02/03/2023     08/14/2023     02/05/2023     02/03/2023    CO2 27 08/14/2023    CO2 24 02/05/2023    CO2 26 02/03/2023    BUN 12 08/14/2023    BUN 14 02/05/2023    BUN 8 02/03/2023    CREATININE 0.73 08/14/2023    CREATININE 0.75 02/05/2023    CREATININE 0.61 02/03/2023       CBC:  Lab Results   Component Value Date    WBC 8.4 08/14/2023    WBC 7.0 02/05/2023    WBC 5.5 01/29/2023    RBC 4.94 08/14/2023    RBC 4.84 02/05/2023    RBC 5.06 01/29/2023    HGB 15.2 08/14/2023    HGB 14.7 02/05/2023    HGB 15.3 01/29/2023    HCT 44.7 08/14/2023    HCT 41.9 02/05/2023    HCT 43.9 01/29/2023    MCV 90 08/14/2023    MCV 87 02/05/2023    MCV 87 01/29/2023    MCHC 34.0 08/14/2023    MCHC 35.1 02/05/2023    MCHC 34.9 01/29/2023    RDW 12.8 08/14/2023    RDW 12.3 02/05/2023    RDW 12.3 01/29/2023     08/14/2023     02/05/2023     01/29/2023       Cardiac Enzymes:    Lab Results   Component Value Date    TROPHS CANCELED 08/14/2023    TROPHS 8 08/14/2023    TROPHS 7 08/14/2023       Hepatic Function Panel:    Lab Results   Component Value Date    ALKPHOS 54 08/14/2023    ALT 18 08/14/2023    AST 20 08/14/2023    PROT 7.7 08/14/2023    BILITOT 0.5 08/14/2023         Diagnostic Studies:     XR chest 1 view    Result Date: 8/14/2023  Interpreted By:  MARKOS TAPIA MD STUDY: Chest Radiograph;  08/14/2023 6:46 PM  INDICATION: Chest pain.  Patient describes pain as similar to that at time of cardiac stent placement 01/2023.  COMPARISON: XR chest 02/05/2023, 01/28/2023.  ACCESSION NUMBER(S): [id number]  ORDERING CLINICIAN: SUZY PENA DO  TECHNIQUE:  Frontal chest was obtained at 1842 hours.  FINDINGS:  CARDIOMEDIASTINAL SILHOUETTE: Cardiomediastinal silhouette is normal in size and  "configuration.  LUNGS: Lungs are clear. There is no evidence of pleural effusion or pneumothorax.  ABDOMEN: No remarkable upper abdominal findings.  BONES: No acute osseous changes.      No acute cardiopulmonary disease.   Signed by Paevl Mancuso MD      EKG:   No results found for: \"EKG\"    Cardiac Cath Transition of Care Summary:  Post Procedure Diagnosis: DIONE of LAD.  Blood Loss:               Estimated blood loss during the procedure was 0 mls.  Specimens Removed:        Number of specimen(s) removed: none.     ____________________________________________________________________________________  CONCLUSIONS:   1. The entire Left Main: 0% stenosis.   2. Proximal LAD Lesion: The percent stenosis is 80%.   3. Proximal LAD Lesion: pre-dilation, Resolute Carlos A 3.00x18 post-dilation : <10% residual stenosis. LAD: pre-procedure JHOAN flow was 3(complete perfusion) and post-procedure JHOAN flow was 3(complete perfusion).   4. Mid LAD Lesion: The percent stenosis is 40%.   5. Proximal CX Lesion: The percent stenosis is 10-30%.   6. Mid RCA Lesion: The percent stenosis is 30%.   7. The Left Ventricular Ejection Fraction is 55%.     ____________________________________________________________________________________  CPT Codes:  Left Heart Cath (visualization of coronaries) and LV-23519; Moderate Sedation Services initial 15 minutes patient >5 years-60608; Stent w angioplasty Left Anterior Descending single major Artery branch (PCI)-77207.LD     ICD 10 Codes:  I21.3-ST elevation (STEMI) myocardial infarction of unspecified site; I20.0-Unstable angina; I48.0-Paroxysmal atrial fibrillation     [id number] Darryn Argueta MD  Performing Physician  Electronically signed by [id number] Darryn Argueta MD on 1/31/2023 at  11:50:00 AM        cc Report to: АННА OROPEZA     cc Report to: [id number] Darryn Argueta MD  Radiology:     Transthoracic Echo (TTE) Complete    (Results Pending)   Nuclear Stress Test    (Results Pending) "       Assessment/Plan:         Patient Active Problem List   Diagnosis    Amaurosis fugax of right eye    Anxiety    BPPV (benign paroxysmal positional vertigo)    Dyslipidemia    High serum vitamin E    Hypertension    Hypertrophy of nasal turbinates    CAD (coronary artery disease)    Claudication (CMS/HCC)    Fatigue    Flexor tendinitis of hand    Nasal septal deviation    Oligomenorrhea    MAYELA on CPAP    Palpitations    PVC (premature ventricular contraction)    Vertigo    Type 2 diabetes mellitus (CMS/HCC)    Transient visual loss of right eye    Stented coronary artery    SOBOE (shortness of breath on exertion)    Pain of right upper extremity    Basal cell carcinoma    Flexor carpi ulnaris tendinitis    [place] nodes    Hyperlipidemia    Hypertriglyceridemia    OA (osteoarthritis)    PCOS (polycystic ovarian syndrome)    Renal cyst    Tinea corporis    Vitamin deficiency    Obesity, Class III, BMI 40-49.9 (morbid obesity) (CMS/HCC)    Lipid disorder    Elevated androgen levels    Sleep apnea    Dyspnea    Controlled type 2 diabetes mellitus without complication, without long-term current use of insulin (CMS/HCC)    Anticoagulant long-term use    Former smoker         ASSESSMENT   53-year-old female seen and evaluated today in routine cardiovascular follow-up.    Meds, vitals, examination as noted.    Chart reviewed in detail discussed with the patient at length.    Impression:  ASHD class II  Status post PCI and stenting of the LAD  Normal LV function  Obesity  Type 2 diabetes  Hypertension  Sleep apnea  Dyslipidemia  Former smoker      PLAN     Recommendation:  Continue exercise and weight loss  Increase Norvasc for better blood pressure control to 10 daily  Return within the next several weeks for perfusion scan and echo  Call results  May be able to switch Plavix over to aspirin afterwards  Follow-up with EP service  Plan to see me back in April or May

## 2024-01-10 ENCOUNTER — OFFICE VISIT (OUTPATIENT)
Dept: CARDIOLOGY | Facility: CLINIC | Age: 54
End: 2024-01-10
Payer: COMMERCIAL

## 2024-01-10 VITALS
WEIGHT: 238 LBS | BODY MASS INDEX: 44.93 KG/M2 | HEIGHT: 61 IN | HEART RATE: 82 BPM | SYSTOLIC BLOOD PRESSURE: 136 MMHG | DIASTOLIC BLOOD PRESSURE: 86 MMHG

## 2024-01-10 DIAGNOSIS — I48.91 ATRIAL FIBRILLATION (MULTI): ICD-10-CM

## 2024-01-10 DIAGNOSIS — E66.01 OBESITY, CLASS III, BMI 40-49.9 (MORBID OBESITY) (MULTI): ICD-10-CM

## 2024-01-10 DIAGNOSIS — I49.3 PVC (PREMATURE VENTRICULAR CONTRACTION): Primary | ICD-10-CM

## 2024-01-10 DIAGNOSIS — R00.2 PALPITATIONS: ICD-10-CM

## 2024-01-10 DIAGNOSIS — Z79.01 ANTICOAGULANT LONG-TERM USE: ICD-10-CM

## 2024-01-10 DIAGNOSIS — Z87.891 FORMER SMOKER: ICD-10-CM

## 2024-01-10 PROBLEM — I5A NON-ISCHEMIC MYOCARDIAL INJURY (NON-TRAUMATIC): Status: ACTIVE | Noted: 2023-02-03

## 2024-01-10 PROBLEM — Z95.5 PRESENCE OF STENT IN ANTERIOR DESCENDING BRANCH OF LEFT CORONARY ARTERY: Status: ACTIVE | Noted: 2023-03-16

## 2024-01-10 PROCEDURE — 99214 OFFICE O/P EST MOD 30 MIN: CPT | Performed by: INTERNAL MEDICINE

## 2024-01-10 PROCEDURE — 4010F ACE/ARB THERAPY RXD/TAKEN: CPT | Performed by: INTERNAL MEDICINE

## 2024-01-10 PROCEDURE — 1036F TOBACCO NON-USER: CPT | Performed by: INTERNAL MEDICINE

## 2024-01-10 PROCEDURE — 3008F BODY MASS INDEX DOCD: CPT | Performed by: INTERNAL MEDICINE

## 2024-01-10 PROCEDURE — 3075F SYST BP GE 130 - 139MM HG: CPT | Performed by: INTERNAL MEDICINE

## 2024-01-10 PROCEDURE — 93000 ELECTROCARDIOGRAM COMPLETE: CPT | Performed by: INTERNAL MEDICINE

## 2024-01-10 PROCEDURE — 3079F DIAST BP 80-89 MM HG: CPT | Performed by: INTERNAL MEDICINE

## 2024-01-10 NOTE — PATIENT INSTRUCTIONS
Continue same medications/treatment.  Patient educated on proper medication use.  Patient educated on risk factor modification.  Please bring any lab results from other providers/physicians to your next appointment.    Please bring all medicines, vitamins, and herbal supplements with you when you come to the office.    Prescriptions will not be filled unless you are compliant with your follow up appointments or have a follow up appointment scheduled as per instruction of your physician. Refills should be requested at the time of your visit.    Follow up with Jordyn in 6 months  Follow up with Dr. Sands in 12 months     OBED BRANDT RN, AM SCRIBING FOR, AND IN THE PRESENCE OF DR. FAVIAN SANDS MD

## 2024-01-10 NOTE — PROGRESS NOTES
CARDIOLOGY OFFICE VISIT      CHIEF COMPLAINT  Chief Complaint   Patient presents with    Follow-up     Patient presented today for a 1 year follow up.  EKG done in office today.         HISTORY OF PRESENT ILLNESS  HPI    53-year-old  female who is followed for paroxysmal atrial fibrillation controlled on dofetilide, metoprolol, magnesium oxide, and anticoagulated with Eliquis. She has a history of hypertension, dyslipidemia, intermittent claudication, obstructive sleep apnea on CPAP, coronary artery disease status post angioplasty with drug-eluting stent to the proximal LAD with left heart catheterization dated January 31, 2023 recommending medical management and normal left ventricular function.    Since the last office visit she has been doing well.  She recalls having 2 episodes of palpitations but very brief lasting up to 2 hours of duration.  They did not limit her to level of activities.    She still using dofetilide 125 mcg twice a day and beta-blocker therapy.    EKG performed today shows sinus rhythm rate of 82 bpm QRS ration 90 ms QT corrected 560 ms.  Rhythm strip shows the same pattern.        Past Medical History  Past Medical History:   Diagnosis Date    Acute nasopharyngitis (common cold) 10/11/2019    Nasopharyngitis    Acute nasopharyngitis (common cold) 10/11/2019    Nasopharyngitis    Body mass index (BMI) 45.0-49.9, adult (CMS/[facility]) 11/09/2021    Body mass index (BMI) of 45.0-49.9 in adult    Chronic ethmoidal sinusitis 09/08/2020    Chronic ethmoidal sinusitis    Chronic ethmoidal sinusitis 10/20/2020    Chronic ethmoidal sinusitis    Chronic rhinitis     Rhinitis    COVID-19 01/14/2022    COVID    Essential (primary) hypertension     Hypertension, benign    Hypertrophy of nasal turbinates 10/20/2020    Hypertrophy, nasal, turbinate    Morbid (severe) obesity due to excess calories (CMS/[facility]) 01/14/2022    Morbid obesity with BMI of 40.0-44.9, adult    Morbid (severe) obesity due to  excess calories (CMS/[facility])     Class 3 severe obesity due to excess calories with serious comorbidity and body mass index (BMI) of 45.0 to 49.9 in adult    Nasal congestion 08/08/2020    Nasal congestion with rhinorrhea    Other abnormal glucose 10/07/2021    Elevated glucose    Other specified disorders of nose and nasal sinuses 09/08/2020    Nasal hypertrophy    Personal history of nicotine dependence 02/10/2020    History of tobacco abuse    Personal history of other diseases of the circulatory system 01/19/2022    History of atrial fibrillation    Personal history of other diseases of the nervous system and sense organs 11/12/2020    History of obstructive sleep apnea    Personal history of other diseases of the respiratory system 08/06/2020    History of allergic rhinitis    Personal history of other diseases of the respiratory system 09/08/2020    History of deviated nasal septum    Personal history of other diseases of the respiratory system 01/27/2020    History of upper respiratory infection    Personal history of other diseases of the respiratory system 03/18/2020    History of acute pharyngitis    Personal history of other diseases of the respiratory system 04/01/2020    History of acute bronchitis    Personal history of other endocrine, nutritional and metabolic disease 12/28/2021    History of morbid obesity    Personal history of other infectious and parasitic diseases 08/10/2021    History of tinea corporis    Personal history of other infectious and parasitic diseases 04/01/2020    History of candidiasis of mouth    Personal history of other infectious and parasitic diseases 04/01/2020    History of candidiasis of vagina    Personal history of other infectious and parasitic diseases 10/15/2019    History of candidiasis of vagina    Personal history of other specified conditions 12/28/2021    History of palpitations    Personal history of other specified conditions 08/06/2020    History of nasal  congestion    Personal history of other specified conditions 09/08/2020    History of postnasal drip    Personal history of other specified conditions 02/18/2022    History of fatigue    Personal history of other specified conditions 01/25/2020    History of postnasal drip    Personal history of other specified conditions 06/23/2020    History of aphasia    Personal history of other specified conditions 01/31/2020    History of wheezing    Personal history of pneumonia (recurrent) 12/22/2021    History of pneumonia    Polyp of colon     Colorectal polyp detected on colonoscopy    Rash and other nonspecific skin eruption 08/10/2021    Rash    Slurred speech     Deficit in communication due to slurred speech    Spontaneous ecchymoses 05/11/2021    Petechiae    Wheezing 01/25/2020    Expiratory wheezing       Social History  Social History     Tobacco Use    Smoking status: Former     Packs/day: 1.50     Years: 30.00     Additional pack years: 0.00     Total pack years: 45.00     Types: Cigarettes     Quit date: 1/26/2020     Years since quitting: 3.9    Smokeless tobacco: Never   Vaping Use    Vaping Use: Never used   Substance Use Topics    Alcohol use: Never    Drug use: Never       Family History     Family History   Problem Relation Name Age of Onset    Diabetes Mother      Hypertension Mother      Other (presence of stent in coronary artery) Mother      Other (presence of stent in coronary artery) Father      Other (CABG) Father      Diabetes Father          Allergies:  Allergies   Allergen Reactions    Oxycodone-Acetaminophen Palpitations     palpitations        Outpatient Medications:  Current Outpatient Medications   Medication Instructions    albuterol (Proventil HFA) 90 mcg/actuation inhaler 2 puffs, inhalation, Every 4 hours PRN    amLODIPine (NORVASC) 10 mg, oral, Daily    apixaban (ELIQUIS) 5 mg, oral, 2 times daily    ascorbic acid (VITAMIN C) 500 mg, oral, Daily    biotin 5 mg, oral    cholecalciferol  (VITAMIN D-3) 25 mcg, oral, Daily    chromium picolinate 1,000 mcg tablet 1 tablet, oral, Daily    clopidogrel (PLAVIX) 75 mg, oral, Daily    dofetilide (TIKOSYN) 125 mcg, oral, Every 12 hours    econazole nitrate 1 % cream 1 Application, 2 times daily, Apply and gentaly massage into affected area(s) twice daily    ezetimibe (ZETIA) 10 mg, oral, Daily    fluticasone (Flonase) 50 mcg/actuation nasal spray 1 spray, Each Nostril, Daily, PRN    Lactobacillus acidophilus (PROBIOTIC ORAL) 1 capsule, oral, Daily    lisinopril 40 mg tablet Take 1 tablet (40 mg) by mouth once daily.    magnesium oxide (MAG-OX) 800 mg, oral, Daily    methocarbamol (ROBAXIN) 500 mg, oral, 4 times daily PRN    metoprolol succinate XL (TOPROL-XL) 50 mg, oral, Nightly    nitroglycerin (NITROSTAT) 0.4 mg, sublingual, Every 5 min PRN    norethindrone (AYGESTIN) 5 mg, oral, Daily    Rybelsus 14 mg, oral, Daily    zinc gluconate 50 mg tablet 50 mg of elemental zinc, oral, Daily          REVIEW OF SYSTEMS  Review of Systems   All other systems reviewed and are negative.        VITALS  Vitals:    01/10/24 1509   BP: 136/86   Pulse: 82       PHYSICAL EXAM  Constitutional:       General: Awake.      Appearance: Normal and healthy appearance. Well-developed and not in distress.   Neck:      Vascular: No JVR. JVD normal.   Pulmonary:      Effort: Pulmonary effort is normal.      Breath sounds: Normal breath sounds. No wheezing. No rhonchi. No rales.   Chest:      Chest wall: Not tender to palpatation.   Cardiovascular:      PMI at left midclavicular line. Normal rate. Regular rhythm. Normal S1. Normal S2.       Murmurs: There is no murmur.      No gallop.  No click. No rub.   Pulses:     Intact distal pulses.   Edema:     Peripheral edema absent.   Abdominal:      Tenderness: There is no abdominal tenderness.   Musculoskeletal: Normal range of motion.         General: No tenderness. Skin:     General: Skin is warm and dry.   Neurological:      General: No  focal deficit present.      Mental Status: Alert and oriented to person, place and time.           ASSESSMENT AND PLAN  Clinical impressions:  1. Paroxysmal atrial fibrillation controlled on dofetilide, metoprolol, magnesium oxide, and anticoagulated with Eliquis.  2. Hypertension, controlled  3. Diabetes mellitus.  4. Asthma.  5. Dyslipidemia on statin.  6. Morbid obesity with a BMI of 41.95.  7. Obstructive sleep apnea on CPAP.  8. Anxiety disorder.  9. Coronary artery disease status post angioplasty with drug-eluting stent to the proximal LAD with 40% mid LAD, 10 to 30% proximal circumflex and 30% mid RCA stenosis managed medically per left heart catheterization dated January 31, 2023. Lexiscan stress test dated March 23, 2023 revealed no acute ischemic changes or infarct patterns with an ejection fraction of 59%.  10. Left ventricular ejection fraction of 60 to 65% per 2D echocardiogram dated January 30, 2023.  11. Left atrial enlargement the left atrial size of 4.6 cm per 2D echocardiogram dated January 30, 2023.  12. Pulmonary hypertension with right ventricular systolic pressure 28 mmHg per 2D echocardiogram dated January 30, 2023.    Plan-recommendations    Overall she is doing well from the electrophysiology standpoint.  Will continue with current medication (dofetilide).    Follow my office every 6 months or sooner needed.    Continue with high risk medication.    Continue anticoagulant therapy.    Continue with beta-blocker therapy.    Risk factor modification and lifestyle modification discussed with patient. Diet , exercise and hydration discussed with patient.    I have personally review with patient during this office visit, laboratory data, echocardiogram results, stress test results, Holter-event monitor results prior and after the last electrophysiology visit. All questions has been answered.    Please excuse any errors in grammar or translation related to this dictation.  Voice recognition  software was utilized to prepare this document.

## 2024-01-29 DIAGNOSIS — Z79.01 ANTICOAGULANT LONG-TERM USE: ICD-10-CM

## 2024-01-29 RX ORDER — APIXABAN 5 MG/1
5 TABLET, FILM COATED ORAL 2 TIMES DAILY
Qty: 60 TABLET | Refills: 3 | Status: SHIPPED | OUTPATIENT
Start: 2024-01-29 | End: 2024-05-29

## 2024-02-15 ENCOUNTER — HOSPITAL ENCOUNTER (OUTPATIENT)
Dept: RADIOLOGY | Facility: CLINIC | Age: 54
Discharge: HOME | End: 2024-02-15
Payer: COMMERCIAL

## 2024-02-15 ENCOUNTER — HOSPITAL ENCOUNTER (OUTPATIENT)
Dept: CARDIOLOGY | Facility: CLINIC | Age: 54
Discharge: HOME | End: 2024-02-15
Payer: COMMERCIAL

## 2024-02-15 VITALS
DIASTOLIC BLOOD PRESSURE: 80 MMHG | BODY MASS INDEX: 44.93 KG/M2 | HEIGHT: 61 IN | SYSTOLIC BLOOD PRESSURE: 140 MMHG | WEIGHT: 238 LBS

## 2024-02-15 DIAGNOSIS — Z95.5 STENTED CORONARY ARTERY: ICD-10-CM

## 2024-02-15 DIAGNOSIS — I25.10 CORONARY ARTERY DISEASE INVOLVING NATIVE HEART WITHOUT ANGINA PECTORIS, UNSPECIFIED VESSEL OR LESION TYPE: ICD-10-CM

## 2024-02-15 DIAGNOSIS — I25.10 CORONARY ARTERY DISEASE, UNSPECIFIED VESSEL OR LESION TYPE, UNSPECIFIED WHETHER ANGINA PRESENT, UNSPECIFIED WHETHER NATIVE OR TRANSPLANTED HEART: ICD-10-CM

## 2024-02-15 DIAGNOSIS — Z95.5 PRESENCE OF STENT IN ANTERIOR DESCENDING BRANCH OF LEFT CORONARY ARTERY: Primary | ICD-10-CM

## 2024-02-15 LAB
AORTIC VALVE MEAN GRADIENT: 5 MMHG
AORTIC VALVE PEAK VELOCITY: 1.38 M/S
AV PEAK GRADIENT: 7.6 MMHG
AVA (PEAK VEL): 2.79 CM2
AVA (VTI): 2.7 CM2
EJECTION FRACTION APICAL 4 CHAMBER: 57.1
EJECTION FRACTION: 54 %
LEFT VENTRICLE INTERNAL DIMENSION DIASTOLE: 5.4 CM (ref 3.5–6)
LEFT VENTRICULAR OUTFLOW TRACT DIAMETER: 2.1 CM
MITRAL VALVE E/A RATIO: 1.03
MITRAL VALVE E/E' RATIO: 17.4
RIGHT VENTRICLE PEAK SYSTOLIC PRESSURE: 12.5 MMHG

## 2024-02-15 PROCEDURE — 93306 TTE W/DOPPLER COMPLETE: CPT

## 2024-02-15 PROCEDURE — 93306 TTE W/DOPPLER COMPLETE: CPT | Performed by: INTERNAL MEDICINE

## 2024-02-15 PROCEDURE — 78452 HT MUSCLE IMAGE SPECT MULT: CPT

## 2024-02-15 PROCEDURE — 3430000001 HC RX 343 DIAGNOSTIC RADIOPHARMACEUTICALS: Performed by: INTERNAL MEDICINE

## 2024-02-15 PROCEDURE — 93017 CV STRESS TEST TRACING ONLY: CPT

## 2024-02-15 PROCEDURE — 2500000004 HC RX 250 GENERAL PHARMACY W/ HCPCS (ALT 636 FOR OP/ED): Performed by: INTERNAL MEDICINE

## 2024-02-15 PROCEDURE — A9502 TC99M TETROFOSMIN: HCPCS | Performed by: INTERNAL MEDICINE

## 2024-02-15 RX ORDER — REGADENOSON 0.08 MG/ML
0.4 INJECTION, SOLUTION INTRAVENOUS ONCE
Status: COMPLETED | OUTPATIENT
Start: 2024-02-15 | End: 2024-02-15

## 2024-02-15 RX ADMIN — TETROFOSMIN 36 MILLICURIE: 0.23 INJECTION, POWDER, LYOPHILIZED, FOR SOLUTION INTRAVENOUS at 10:25

## 2024-02-15 RX ADMIN — REGADENOSON 0.4 MG: 0.08 INJECTION, SOLUTION INTRAVENOUS at 10:36

## 2024-02-16 ENCOUNTER — HOSPITAL ENCOUNTER (OUTPATIENT)
Dept: RADIOLOGY | Facility: CLINIC | Age: 54
Discharge: HOME | End: 2024-02-16
Payer: COMMERCIAL

## 2024-02-16 PROCEDURE — A9502 TC99M TETROFOSMIN: HCPCS | Performed by: INTERNAL MEDICINE

## 2024-02-16 PROCEDURE — 3430000001 HC RX 343 DIAGNOSTIC RADIOPHARMACEUTICALS: Performed by: INTERNAL MEDICINE

## 2024-02-16 RX ADMIN — TETROFOSMIN 34.9 MILLICURIE: 0.23 INJECTION, POWDER, LYOPHILIZED, FOR SOLUTION INTRAVENOUS at 11:19

## 2024-02-26 ENCOUNTER — TELEPHONE (OUTPATIENT)
Dept: CARDIOLOGY | Facility: CLINIC | Age: 54
End: 2024-02-26
Payer: COMMERCIAL

## 2024-02-26 NOTE — TELEPHONE ENCOUNTER
Patient called and left a message that she looked up her stress test results and it was abnormal. She does not have a follow up appointment until 4/15/24 and would like to know if this is something of concern and what she should do moving forward. Please advise, thank you.

## 2024-02-27 ENCOUNTER — LAB (OUTPATIENT)
Dept: LAB | Facility: LAB | Age: 54
End: 2024-02-27
Payer: COMMERCIAL

## 2024-02-27 ENCOUNTER — OFFICE VISIT (OUTPATIENT)
Dept: CARDIOLOGY | Facility: CLINIC | Age: 54
End: 2024-02-27
Payer: COMMERCIAL

## 2024-02-27 VITALS
SYSTOLIC BLOOD PRESSURE: 152 MMHG | HEART RATE: 80 BPM | WEIGHT: 239.9 LBS | DIASTOLIC BLOOD PRESSURE: 84 MMHG | BODY MASS INDEX: 45.33 KG/M2

## 2024-02-27 DIAGNOSIS — R94.39 ABNORMAL STRESS TEST: ICD-10-CM

## 2024-02-27 DIAGNOSIS — I5A NON-ISCHEMIC MYOCARDIAL INJURY (NON-TRAUMATIC): ICD-10-CM

## 2024-02-27 DIAGNOSIS — I25.10 CORONARY ARTERY DISEASE INVOLVING NATIVE HEART WITHOUT ANGINA PECTORIS, UNSPECIFIED VESSEL OR LESION TYPE: ICD-10-CM

## 2024-02-27 DIAGNOSIS — Z87.891 FORMER SMOKER: ICD-10-CM

## 2024-02-27 LAB
ANION GAP SERPL CALC-SCNC: 17 MMOL/L (ref 10–20)
BUN SERPL-MCNC: 10 MG/DL (ref 6–23)
CALCIUM SERPL-MCNC: 9.6 MG/DL (ref 8.6–10.3)
CHLORIDE SERPL-SCNC: 104 MMOL/L (ref 98–107)
CO2 SERPL-SCNC: 24 MMOL/L (ref 21–32)
CREAT SERPL-MCNC: 0.81 MG/DL (ref 0.5–1.05)
EGFRCR SERPLBLD CKD-EPI 2021: 85 ML/MIN/1.73M*2
ERYTHROCYTE [DISTWIDTH] IN BLOOD BY AUTOMATED COUNT: 12.6 % (ref 11.5–14.5)
GLUCOSE SERPL-MCNC: 189 MG/DL (ref 74–99)
HCT VFR BLD AUTO: 45.2 % (ref 36–46)
HGB BLD-MCNC: 15.4 G/DL (ref 12–16)
INR PPP: 1.2 (ref 0.9–1.1)
MCH RBC QN AUTO: 31 PG (ref 26–34)
MCHC RBC AUTO-ENTMCNC: 34.1 G/DL (ref 32–36)
MCV RBC AUTO: 91 FL (ref 80–100)
NRBC BLD-RTO: 0 /100 WBCS (ref 0–0)
PLATELET # BLD AUTO: 310 X10*3/UL (ref 150–450)
POTASSIUM SERPL-SCNC: 4.2 MMOL/L (ref 3.5–5.3)
PROTHROMBIN TIME: 14 SECONDS (ref 9.8–12.8)
RBC # BLD AUTO: 4.96 X10*6/UL (ref 4–5.2)
SODIUM SERPL-SCNC: 141 MMOL/L (ref 136–145)
WBC # BLD AUTO: 6.9 X10*3/UL (ref 4.4–11.3)

## 2024-02-27 PROCEDURE — 85027 COMPLETE CBC AUTOMATED: CPT

## 2024-02-27 PROCEDURE — 4010F ACE/ARB THERAPY RXD/TAKEN: CPT | Performed by: INTERNAL MEDICINE

## 2024-02-27 PROCEDURE — 3008F BODY MASS INDEX DOCD: CPT | Performed by: INTERNAL MEDICINE

## 2024-02-27 PROCEDURE — 3077F SYST BP >= 140 MM HG: CPT | Performed by: INTERNAL MEDICINE

## 2024-02-27 PROCEDURE — 1036F TOBACCO NON-USER: CPT | Performed by: INTERNAL MEDICINE

## 2024-02-27 PROCEDURE — 99214 OFFICE O/P EST MOD 30 MIN: CPT | Performed by: INTERNAL MEDICINE

## 2024-02-27 PROCEDURE — 36415 COLL VENOUS BLD VENIPUNCTURE: CPT

## 2024-02-27 PROCEDURE — 85610 PROTHROMBIN TIME: CPT

## 2024-02-27 PROCEDURE — 3079F DIAST BP 80-89 MM HG: CPT | Performed by: INTERNAL MEDICINE

## 2024-02-27 PROCEDURE — 80048 BASIC METABOLIC PNL TOTAL CA: CPT

## 2024-02-27 RX ORDER — ASPIRIN 325 MG
325 TABLET ORAL ONCE
Status: CANCELLED | OUTPATIENT
Start: 2024-02-27 | End: 2024-02-27

## 2024-02-27 NOTE — PATIENT INSTRUCTIONS
Continue same medications/treatment.  Patient educated on proper medication use.  Patient educated on risk factor modification.  Please bring any lab results from other providers/physicians to your next appointment.    Please bring all medicines, vitamins, and herbal supplements with you when you come to the office.    Prescriptions will not be filled unless you are compliant with your follow up appointments or have a follow up appointment scheduled as per instruction of your physician. Refills should be requested at the time of your visit.    Follow up after [facility]  Blood work to be done prior to procedure    I, NIKKI MORGAN RN, AM SCRIBING FOR AND IN THE PRESENCE OF DR. DREA REZA, DO, FACC

## 2024-02-27 NOTE — PROGRESS NOTES
Patient:  Turner Thakkar  YOB: 1970  MRN: [id number]       HPI:       Turner Thakkar is a 54 y.o. female who returns today for cardiac follow-up.  Patient had recent nuclear scan.  Scan does show ischemia which overall was present on her precath scan last year on her post cath scan was not.  She is not having any terrible chest pain but she is fatigued and a little bit out of breath.  This could represent restenosis and her progression of disease.  Based on these findings plans are to go ahead and catheter this week.  She will hold her Eliquis today and tomorrow and hopefully be in the Cath Lab Thursday.  She will start aspirin today and continue Plavix.  She will also increase her metoprolol to 100 a day.  Based on cath results we will determine any further intervention.      Objective:     There were no vitals filed for this visit.    Wt Readings from Last 4 Encounters:   02/15/24 108 kg (238 lb)   01/10/24 108 kg (238 lb)   01/08/24 109 kg (240 lb 1.6 oz)   01/05/24 107 kg (236 lb)       Allergies:     Allergies   Allergen Reactions    Oxycodone-Acetaminophen Palpitations     palpitations        Medications:     Current Outpatient Medications   Medication Instructions    albuterol (Proventil HFA) 90 mcg/actuation inhaler 2 puffs, inhalation, Every 4 hours PRN    amLODIPine (NORVASC) 10 mg, oral, Daily    ascorbic acid (VITAMIN C) 500 mg, oral, Daily    biotin 5 mg, oral    cholecalciferol (VITAMIN D-3) 25 mcg, oral, Daily    chromium picolinate 1,000 mcg tablet 1 tablet, oral, Daily    clopidogrel (PLAVIX) 75 mg, oral, Daily    dofetilide (TIKOSYN) 125 mcg, oral, Every 12 hours    econazole nitrate 1 % cream 1 Application, 2 times daily, Apply and gentaly massage into affected area(s) twice daily    Eliquis 5 mg, oral, 2 times daily    ezetimibe (ZETIA) 10 mg, oral, Daily    fluticasone (Flonase) 50 mcg/actuation nasal spray 1 spray, Each Nostril, Daily, PRN    Lactobacillus acidophilus (PROBIOTIC ORAL) 1  capsule, oral, Daily    lisinopril 40 mg tablet Take 1 tablet (40 mg) by mouth once daily.    magnesium oxide (MAG-OX) 800 mg, oral, Daily    metoprolol succinate XL (TOPROL-XL) 50 mg, oral, Nightly    nitroglycerin (NITROSTAT) 0.4 mg, sublingual, Every 5 min PRN    norethindrone (AYGESTIN) 5 mg, oral, Daily    Rybelsus 14 mg, oral, Daily    zinc gluconate 50 mg tablet 50 mg of elemental zinc, oral, Daily       Physical Examination:     Constitutional:       Appearance: Healthy appearance. Not in distress.   Neck:      Vascular: No JVR. JVD normal.   Pulmonary:      Effort: Pulmonary effort is normal.      Breath sounds: Normal breath sounds. No wheezing. No rhonchi. No rales.   Chest:      Chest wall: Not tender to palpatation.   Cardiovascular:      PMI at left midclavicular line. Normal rate. Regular rhythm. Normal S1. Normal S2.       Murmurs: There is no murmur.      No gallop.  No click. No rub.   Pulses:     Intact distal pulses.   Edema:     Peripheral edema absent.   Abdominal:      General: Bowel sounds are normal.      Palpations: Abdomen is soft.      Tenderness: There is no abdominal tenderness.   Musculoskeletal: Normal range of motion.         General: No tenderness. Skin:     General: Skin is warm and dry.   Neurological:      General: No focal deficit present.      Mental Status: Alert and oriented to person, place and time.          Lab:     CBC:   Lab Results   Component Value Date    WBC 8.4 08/14/2023    RBC 4.94 08/14/2023    HGB 15.2 08/14/2023    HCT 44.7 08/14/2023     08/14/2023        CMP:    Lab Results   Component Value Date     08/14/2023    K 3.6 08/14/2023     08/14/2023    CO2 27 08/14/2023    BUN 12 08/14/2023    CREATININE 0.73 08/14/2023    GLUCOSE 96 08/14/2023    CALCIUM 9.9 08/14/2023       Magnesium:    Lab Results   Component Value Date    MG 2.23 08/14/2023       Lipid Profile:    Lab Results   Component Value Date    TRIG 123 06/24/2023    HDL 44.1  06/24/2023       TSH:    Lab Results   Component Value Date    TSH 1.15 08/14/2023       BNP:   Lab Results   Component Value Date    BNP 18 08/14/2023        PT/INR:    Lab Results   Component Value Date    PROTIME 12.6 08/14/2023    INR 1.1 08/14/2023       HgBA1c:    Lab Results   Component Value Date    HGBA1C 5.4 06/24/2023       BMP:  Lab Results   Component Value Date     08/14/2023     02/05/2023     02/03/2023    K 3.6 08/14/2023    K 4.1 02/05/2023    K 4.1 02/03/2023     08/14/2023     02/05/2023     02/03/2023    CO2 27 08/14/2023    CO2 24 02/05/2023    CO2 26 02/03/2023    BUN 12 08/14/2023    BUN 14 02/05/2023    BUN 8 02/03/2023    CREATININE 0.73 08/14/2023    CREATININE 0.75 02/05/2023    CREATININE 0.61 02/03/2023       CBC:  Lab Results   Component Value Date    WBC 8.4 08/14/2023    WBC 7.0 02/05/2023    WBC 5.5 01/29/2023    RBC 4.94 08/14/2023    RBC 4.84 02/05/2023    RBC 5.06 01/29/2023    HGB 15.2 08/14/2023    HGB 14.7 02/05/2023    HGB 15.3 01/29/2023    HCT 44.7 08/14/2023    HCT 41.9 02/05/2023    HCT 43.9 01/29/2023    MCV 90 08/14/2023    MCV 87 02/05/2023    MCV 87 01/29/2023    MCHC 34.0 08/14/2023    MCHC 35.1 02/05/2023    MCHC 34.9 01/29/2023    RDW 12.8 08/14/2023    RDW 12.3 02/05/2023    RDW 12.3 01/29/2023     08/14/2023     02/05/2023     01/29/2023       Cardiac Enzymes:    Lab Results   Component Value Date    TROPHS CANCELED 08/14/2023    TROPHS 8 08/14/2023    TROPHS 7 08/14/2023       Hepatic Function Panel:    Lab Results   Component Value Date    ALKPHOS 54 08/14/2023    ALT 18 08/14/2023    AST 20 08/14/2023    PROT 7.7 08/14/2023    BILITOT 0.5 08/14/2023       Diagnostic Studies:     Nuclear Stress Test    Result Date: 2/16/2024  Interpreted By:  Feliciano Cosby and Christo Dennis STUDY: MYOCARDIAL PERFUSION STRESS TEST WITH LEXISCAN   Performing facility: [facility], [facility],  [address], [place] [address] [facility] Provider:  Drea Urrutia DO, [facility] PCP:  Dr. PHELPS Supervising provider:  Drea Davis DO   INDICATION: STENTED CORONARY ARTERY; CAD.   HISTORY: Gender:  F; Age:  53 y/o ; Height:  .9 cm cm; Weight:   .956 kg kg.   HIGH CHOLESTEROL; CAD; DIABETES; FAMILY HX/CAD; HTN; PALPITATIONS; PVC; PAF; FATIGUE; MAYELA.   Quit smoking 4 years ago.   Cardiac catheterization on 2023.  PTCA on 2023-LAD.   COMPARISON: No comparison.   ACCESSION NUMBER(S): [id number]   ORDERING CLINICIAN: DREA URRUTIA   TECHNIQUE: TWO DAY protocol. Stress injection: Date:02/15/2024, 36.0 mCi of Myoview IV 20 seconds after rapid injection of Lexiscan. Rest injection: Date: 02/16/2024, 34.9 mCi of Myoview IV at rest. The patient had a rapid injection of 0.4 mg of Lexiscan IV over 10 seconds. Imaging was performed by gated tomographic technique. Reason for Lexiscan: A-FIB; UNSTEADY GAIT.   STRESS TEST DATA: Resting heart rate was 71 BPM. Resting blood pressure was 132/80 mmHg. Peak blood pressure was 142/76 mmHg. Peak heart rate was 96 BPM.   TEST TERMINATED DUE TO:  Protocol completed.   FINDINGS: STRESS TEST RESULTS:   Resting electrocardiogram revealed sinus rhythm with occasional PVCs. There were no significant ischemic ECG changes or dysrhythmias. The patient did not have chest pains/symptoms during procedure. There was a normal recovery phase.   IMAGING RESULTS:   Image quality was good. Rest and stress tomographic images were reviewed and revealed abnormal perfusion. There was evidence of perfusion abnormality with a small to medium area of moderately reduced perfusion uptake in the anteroapical wall, which is reversible on rest images consistent with ischemia. There was no evidence of perfusion abnormality of myocardial infarction. There was no left ventricular dilatation with stress. Overall left ventricular systolic function appeared to be normal. There were no regional wall  motion abnormalities. LVEF was 55%. TID is 1.13 and is normal. There was evidence of substantial breast tissue attenuation artifact.       Abnormal Lexiscan Myoview cardiac perfusion stress test. Moderate anteroapical myocardial ischemia by perfusion imaging. No  myocardial infarction by perfusion imaging. Normal left ventricular systolic function. Left ventricular ejection fraction 55 %. When compared to prior study from January 2023, the anteroapical ischemia was previously described. However the previously described anteroseptal fixed defect is no longer visualized. There is significant breast tissue attenuation artifact which reduces the sensitivity and specificity of this interpretation. Clinical correlation is recommended..     Signed by: Feliciano Cosby 2/16/2024 2:54 PM Dictation workstation:   [id number]    Cardiac Cath Transition of Care Summary:  Post Procedure Diagnosis: DIONE of LAD.  Blood Loss:               Estimated blood loss during the procedure was 0 mls.  Specimens Removed:        Number of specimen(s) removed: none.     ____________________________________________________________________________________  CONCLUSIONS:   1. The entire Left Main: 0% stenosis.   2. Proximal LAD Lesion: The percent stenosis is 80%.   3. Proximal LAD Lesion: pre-dilation, Resolute Carlos A 3.00x18 post-dilation : <10% residual stenosis. LAD: pre-procedure JHOAN flow was 3(complete perfusion) and post-procedure JHOAN flow was 3(complete perfusion).   4. Mid LAD Lesion: The percent stenosis is 40%.   5. Proximal CX Lesion: The percent stenosis is 10-30%.   6. Mid RCA Lesion: The percent stenosis is 30%.   7. The Left Ventricular Ejection Fraction is 55%.     ____________________________________________________________________________________  CPT Codes:  Left Heart Cath (visualization of coronaries) and LV-32565; Moderate Sedation Services initial 15 minutes patient >5 years-67163; Stent w angioplasty Left Anterior Descending  single major Artery branch (PCI)-93439.LD     ICD 10 Codes:  I21.3-ST elevation (STEMI) myocardial infarction of unspecified site; I20.0-Unstable angina; I48.0-Paroxysmal atrial fibrillation     [id number] Darryn Argueta MD  Performing Physician  Electronically signed by [id number] Darryn Argueta MD on 1/31/2023 at  11:50:00 AM        cc Report to: АННА OROPEZA     cc Report to: [id number] Darryn Argueta MD  Radiology:     No orders to display       Problem List:     Patient Active Problem List   Diagnosis    Amaurosis fugax of right eye    Anxiety    BPPV (benign paroxysmal positional vertigo)    Dyslipidemia    High serum vitamin E    Hypertension    Hypertrophy of nasal turbinates    CAD (coronary artery disease)    Claudication (CMS/HCC)    Fatigue    Flexor tendinitis of hand    Nasal septal deviation    Oligomenorrhea    MAYELA on CPAP    Palpitations    PVC (premature ventricular contraction)    Vertigo    Type 2 diabetes mellitus (CMS/HCC)    Transient visual loss of right eye    Stented coronary artery    SOBOE (shortness of breath on exertion)    Pain of right upper extremity    Basal cell carcinoma    Flexor carpi ulnaris tendinitis    [place] nodes    Hyperlipidemia    Hypertriglyceridemia    OA (osteoarthritis)    PCOS (polycystic ovarian syndrome)    Renal cyst    Tinea corporis    Vitamin deficiency    Obesity, Class III, BMI 40-49.9 (morbid obesity) (CMS/HCC)    Lipid disorder    Elevated androgen levels    Sleep apnea    Dyspnea    Controlled type 2 diabetes mellitus without complication, without long-term current use of insulin (CMS/HCC)    Anticoagulant long-term use    Former smoker    Non-ischemic myocardial injury (non-traumatic)    Presence of stent in anterior descending branch of left coronary artery       Asessment:       54-year-old female here for routine follow-up and to review cardiac testing  Meds, vitals, examination as noted.    Chart reviewed in detail discussed with the patient  at length.    Impression:  ASHD class II  Abnormal nuclear scan suggesting we stenosis and/or Denovo disease and ischemia requiring ASAP repeat catheterization  Former smoker  Prior PCI and stenting of the LAD January 2023  Long-term anticoagulation use  Former smoker  Obstructive sleep apnea  Type 2 diabetes  Morbid obesity    Plan:   Recommendation:  Schedule catheterization  Hold anticoagulation for now  Add baby aspirin to her Plavix and double up on her beta-blocker  Disposition to follow  Will need ongoing exercise weight loss program afterwards  Resume anticoagulation after procedure  Call if any problems arise in the meantime

## 2024-02-27 NOTE — TELEPHONE ENCOUNTER
Per Dr. Dylan Urrutia, DO patient needs to come in today at noon to discuss the abnormal stress test. I called and l/m for her to come into the office today.

## 2024-02-29 ENCOUNTER — APPOINTMENT (OUTPATIENT)
Dept: CARDIOLOGY | Facility: HOSPITAL | Age: 54
End: 2024-02-29
Payer: COMMERCIAL

## 2024-02-29 ENCOUNTER — HOSPITAL ENCOUNTER (OUTPATIENT)
Facility: HOSPITAL | Age: 54
Setting detail: OUTPATIENT SURGERY
Discharge: HOME | End: 2024-02-29
Attending: INTERNAL MEDICINE | Admitting: INTERNAL MEDICINE
Payer: COMMERCIAL

## 2024-02-29 VITALS
HEART RATE: 71 BPM | RESPIRATION RATE: 16 BRPM | DIASTOLIC BLOOD PRESSURE: 70 MMHG | SYSTOLIC BLOOD PRESSURE: 112 MMHG | TEMPERATURE: 36.4 F | OXYGEN SATURATION: 97 %

## 2024-02-29 DIAGNOSIS — R94.39 ABNORMAL STRESS TEST: Primary | ICD-10-CM

## 2024-02-29 DIAGNOSIS — I20.9 ANGINA PECTORIS, UNSPECIFIED (CMS-HCC): ICD-10-CM

## 2024-02-29 DIAGNOSIS — I5A NON-ISCHEMIC MYOCARDIAL INJURY (NON-TRAUMATIC): ICD-10-CM

## 2024-02-29 DIAGNOSIS — I25.10 CORONARY ARTERY DISEASE INVOLVING NATIVE HEART WITHOUT ANGINA PECTORIS, UNSPECIFIED VESSEL OR LESION TYPE: ICD-10-CM

## 2024-02-29 PROCEDURE — C1894 INTRO/SHEATH, NON-LASER: HCPCS | Performed by: INTERNAL MEDICINE

## 2024-02-29 PROCEDURE — 2720000007 HC OR 272 NO HCPCS: Performed by: INTERNAL MEDICINE

## 2024-02-29 PROCEDURE — 93005 ELECTROCARDIOGRAM TRACING: CPT | Mod: 59

## 2024-02-29 PROCEDURE — 2500000005 HC RX 250 GENERAL PHARMACY W/O HCPCS: Performed by: INTERNAL MEDICINE

## 2024-02-29 PROCEDURE — 7100000010 HC PHASE TWO TIME - EACH INCREMENTAL 1 MINUTE: Performed by: INTERNAL MEDICINE

## 2024-02-29 PROCEDURE — 7100000009 HC PHASE TWO TIME - INITIAL BASE CHARGE: Performed by: INTERNAL MEDICINE

## 2024-02-29 PROCEDURE — 93458 L HRT ARTERY/VENTRICLE ANGIO: CPT | Performed by: INTERNAL MEDICINE

## 2024-02-29 PROCEDURE — 99152 MOD SED SAME PHYS/QHP 5/>YRS: CPT | Performed by: INTERNAL MEDICINE

## 2024-02-29 PROCEDURE — 2500000004 HC RX 250 GENERAL PHARMACY W/ HCPCS (ALT 636 FOR OP/ED): Performed by: NURSE PRACTITIONER

## 2024-02-29 PROCEDURE — 93571 IV DOP VEL&/PRESS C FLO 1ST: CPT | Performed by: INTERNAL MEDICINE

## 2024-02-29 PROCEDURE — 2550000001 HC RX 255 CONTRASTS: Performed by: INTERNAL MEDICINE

## 2024-02-29 PROCEDURE — 85347 COAGULATION TIME ACTIVATED: CPT

## 2024-02-29 PROCEDURE — C1769 GUIDE WIRE: HCPCS | Performed by: INTERNAL MEDICINE

## 2024-02-29 PROCEDURE — 2500000004 HC RX 250 GENERAL PHARMACY W/ HCPCS (ALT 636 FOR OP/ED): Performed by: INTERNAL MEDICINE

## 2024-02-29 PROCEDURE — C1887 CATHETER, GUIDING: HCPCS | Performed by: INTERNAL MEDICINE

## 2024-02-29 RX ORDER — FENTANYL CITRATE 50 UG/ML
INJECTION, SOLUTION INTRAMUSCULAR; INTRAVENOUS AS NEEDED
Status: DISCONTINUED | OUTPATIENT
Start: 2024-02-29 | End: 2024-02-29 | Stop reason: HOSPADM

## 2024-02-29 RX ORDER — SODIUM CHLORIDE 9 MG/ML
100 INJECTION, SOLUTION INTRAVENOUS CONTINUOUS
Status: ACTIVE | OUTPATIENT
Start: 2024-02-29 | End: 2024-02-29

## 2024-02-29 RX ORDER — HEPARIN SODIUM 1000 [USP'U]/ML
INJECTION, SOLUTION INTRAVENOUS; SUBCUTANEOUS AS NEEDED
Status: DISCONTINUED | OUTPATIENT
Start: 2024-02-29 | End: 2024-02-29 | Stop reason: HOSPADM

## 2024-02-29 RX ORDER — ACETAMINOPHEN 325 MG/1
650 TABLET ORAL EVERY 6 HOURS PRN
Status: DISCONTINUED | OUTPATIENT
Start: 2024-02-29 | End: 2024-02-29 | Stop reason: HOSPADM

## 2024-02-29 RX ORDER — SODIUM CHLORIDE 9 MG/ML
100 INJECTION, SOLUTION INTRAVENOUS CONTINUOUS
Status: DISCONTINUED | OUTPATIENT
Start: 2024-02-29 | End: 2024-02-29

## 2024-02-29 RX ORDER — MIDAZOLAM HYDROCHLORIDE 1 MG/ML
INJECTION, SOLUTION INTRAMUSCULAR; INTRAVENOUS AS NEEDED
Status: DISCONTINUED | OUTPATIENT
Start: 2024-02-29 | End: 2024-02-29 | Stop reason: HOSPADM

## 2024-02-29 RX ORDER — LIDOCAINE HYDROCHLORIDE 20 MG/ML
INJECTION, SOLUTION INFILTRATION; PERINEURAL AS NEEDED
Status: DISCONTINUED | OUTPATIENT
Start: 2024-02-29 | End: 2024-02-29 | Stop reason: HOSPADM

## 2024-02-29 RX ADMIN — ACETAMINOPHEN 650 MG: 325 TABLET ORAL at 16:28

## 2024-02-29 RX ADMIN — SODIUM CHLORIDE 100 ML/HR: 9 INJECTION, SOLUTION INTRAVENOUS at 10:15

## 2024-02-29 ASSESSMENT — COLUMBIA-SUICIDE SEVERITY RATING SCALE - C-SSRS
1. IN THE PAST MONTH, HAVE YOU WISHED YOU WERE DEAD OR WISHED YOU COULD GO TO SLEEP AND NOT WAKE UP?: NO
6. HAVE YOU EVER DONE ANYTHING, STARTED TO DO ANYTHING, OR PREPARED TO DO ANYTHING TO END YOUR LIFE?: NO
2. HAVE YOU ACTUALLY HAD ANY THOUGHTS OF KILLING YOURSELF?: NO

## 2024-02-29 ASSESSMENT — PAIN SCALES - GENERAL
PAINLEVEL_OUTOF10: 3
PAINLEVEL_OUTOF10: 0 - NO PAIN

## 2024-02-29 ASSESSMENT — PAIN - FUNCTIONAL ASSESSMENT
PAIN_FUNCTIONAL_ASSESSMENT: 0-10
PAIN_FUNCTIONAL_ASSESSMENT: 0-10

## 2024-02-29 NOTE — NURSING NOTE
Handoff report received from off-going RN Laquita. Patient presents today for planned LHC with Dr. Adrian. IV infusing in Lt hand, patient has recently used the BR. Transport here and patient transported to cath lab.

## 2024-02-29 NOTE — NURSING NOTE
Patient medicated with Tylenol for 3/10 tenderness at rt radial site. Site remains stable, vital signs stable. Vasc band flat at 1615 when only 1 ml air removed. Will remove vasc band completely at this time.

## 2024-02-29 NOTE — PROGRESS NOTES
Patient is stable status post LHC under the care of Dr. Adrian.  Discussed results of procedure with patient and her .  Pictures provided.  Findings of the LHC revealed 50% stenosis in the proximal LAD, patent previous stent in the LAD, mild diffuse disease in the circumflex and RCA with a normal LVEF.  Continued medical management is advised.  Patient is scheduled to follow-up with Dr. Urrutia in April, 2024 or sooner as needed.  All questions answered.  Both verbalized understanding.

## 2024-02-29 NOTE — POST-PROCEDURE NOTE
Physician Transition of Care Summary  Invasive Cardiovascular Lab    Procedure Date: 2/29/2024  Attending:    * Carlito Adrian - Primary  Resident/Fellow/Other Assistant: Surgeon(s) and Role:    Indications:   Pre-op Diagnosis     * Abnormal stress test [R94.39]     * Coronary artery disease involving native heart without angina pectoris, unspecified vessel or lesion type [I25.10]     * Non-ischemic myocardial injury (non-traumatic) [I5A]    Post-procedure diagnosis:   Post-op Diagnosis     * Abnormal stress test [R94.39]     * Coronary artery disease involving native heart without angina pectoris, unspecified vessel or lesion type [I25.10]     * Non-ischemic myocardial injury (non-traumatic) [I5A]    Procedure(s):   Left Heart Cath Possible STAT  91320 - CA CATH PLMT L HRT & ARTS W/NJX & ANGIO IMG S&I    PCI DIONE Stent- Coronary    IFR (Instant Wave Free Ration)    CA CATH PLMT L HRT & ARTS W/NJX & ANGIO IMG S&I [30617]    Procedure Findings:   50% stenosis of proximal LAD, patent previous stent, mild diffuse disease of distal LAD, mild disease of circumflex, mild disease of right coronary artery, normal left ventricular function, IFR of LAD was 0.91    Description of the Procedure:   Left heart catheterization coronary angiography left ventriculogram with IFR    Complications:   None    Stents/Implants:       Anticoagulation/Antiplatelet Plan:   Intravenous heparin    Estimated Blood Loss:   * No values recorded between 2/29/2024  1:24 PM and 2/29/2024  1:59 PM *    Anesthesia: Moderate Sedation Anesthesia Staff: No anesthesia staff entered.    Any Specimen(s) Removed:   No specimens collected during this procedure.    Disposition:   Medical therapy      Electronically signed by: Carlito Adrian MD, 2/29/2024 1:59 PM

## 2024-02-29 NOTE — NURSING NOTE
Vasc band removed completely at 1640, rt radial site remains stable. Biocclusive dressing applied. Patient instructed not to bend/flex rt wrist, no pushing or pulling and to treat it like a sprained wrist. Patient verbalized understanding. Also informed to call nursing staff immediately if any sudden pop or pain/swelling in rt wrist. Call bell within reach. Will continue to monitor rt radial site.

## 2024-02-29 NOTE — NURSING NOTE
Patient S/P left Heart Cath today via rt radial approach. Rt radial site remains stable, radial pulse palpable, hand warm to touch, brisk cap refill. Patient ambulated with standby assist to BR, tolerated well. Discharge instructions given regarding medication administration and purpose, not to stop Plavix without cardiology consent, site care or rt wrist. Patient verbalized understanding and able to teachback when to resume Eliquis. Patient discharged to home via w/c.

## 2024-02-29 NOTE — NURSING NOTE
Patient received from cath lab at 140, S/P [facility] via rt radial site. Vasc band in place, cath lab reports inflated with 11ml air. Ulnar pulse is palpable and hand warm to touch, brisk cap refill.

## 2024-02-29 NOTE — DISCHARGE INSTRUCTIONS
**Resume Eliquis today, 2/29/2024 evening dose          CARDIAC CATHETERIZATION DISCHARGE INSTRUCTIONS     FOR SUDDEN AND SEVERE CHEST PAIN, SHORTNESS OF BREATH, EXCESSIVE BLEEDING, SIGNS OF STROKE, OR CHANGES IN MENTAL STATUS YOU SHOULD CALL 911 IMMEDIATELY.     If your provider has prescribed aspirin and/or clopidogrel (Plavix), or prasugrel (Effient), or ticagrelor (Brilinta), DO NOT STOP THESE MEDICATIONS for any reason without talking to your cardiologist first. If any of these were prescribed, you must take them every day without missing a single dose. If you are getting low on these medications, contact your provider immediately for a refill.     FOR NEXT 24 HOURS  - Upon discharge, you should return home and rest for the remainder of the day and evening. You do not have to stay on bed rest but should not be very active.  It is recommended a responsible adult be with you for the first 24 hours after the procedure.    - No driving for 24 hours after procedure. Please arrange for someone to drive you home from the hospital today.     - Do not drive, operate machinery, or use power tools for 24 hours after your procedure.     - Do not make any legal decisions for 24 hours after your procedure.     - Do not drink alcoholic beverages for 24 hours after your procedure.    WOUND CARE   *FOR FEMORAL (LEG) ACCESS*  ·      Avoid heavy lifting (over 10 pounds) for 7 days, squatting or excessive bending for 2 days, and strenuous exercise for 7 days.  ·      No submerged bathing, swimming, or hot tubs for the next 7 days, or until fully healed.  ·      Avoid sexual activity for 3-4 days until any groin discomfort has ceased.     *FOR RADIAL (WRIST) ACCESS*  ·      No lifting more than 5 pounds or excessive use of the wrist for 24 hours - for example, treat your wrist as if it is sprained.  ·      Do not engage in vigorous activities (tennis, golf, bowling, weights) for at least 48 hours after the procedure.  ·      Do not  submerge the wrist for 7 days after the procedure.  ·      You should expect mild tingling in your hand and tenderness at the puncture site for up to 3 days.    - The transparent dressing should be removed from the site 24 hours after the procedure.  Wash the site gently with soap and water. Rinse well and pat dry. Keep the area clean and dry. You may apply a Band-Aid to the site. Avoid lotions, ointments, or powders until fully healed.     - You may shower the day after your procedure.      - It is normal to notice a small bruise around the puncture site and/or a small grape sized or smaller lump. Any large bruising or large lump warrants a call to the office.     - If bleeding should occur, lay down and apply pressure to the affected area for 10 minutes.  If the bleeding stops notify your physician.  If there is a large amount of bleeding or spurting of blood CALL 911 immediately.  DO NOT drive yourself to the hospital.    - You may experience some tenderness, bruising or minimal inflammation.  If you have any concerns, you may contact the Cath Lab or if any of these symptoms become excessive, contact your cardiologist or go to the emergency room.     OTHER INSTRUCTIONS  - You may take acetaminophen (Tylenol) as directed for discomfort.  If pain is not relieved with acetaminophen (Tylenol), contact your doctor.    - If you notice or experience any of the following, you should notify your doctor or seek medical attention  Chest pain or discomfort  Change in mental status or weakness in extremities.  Dizziness, light headedness, or feeling faint.  Change in the site where the procedure was performed, such as bleeding or an increased area of bruising or swelling.  Tingling, numbness, pain, or coolness in the leg/arm beyond the site where the procedure was performed.  Signs of infection (i.e. shaking chills, temperature > 100 degrees Fahrenheit, warmth, redness) in the leg/arm area where the procedure was  performed.  Changes in urination   Bloody or black stools  Vomiting blood  Severe nose bleeds  Any excessive bleeding    - If you DO NOT have an appointment with your cardiologist within 2-4 weeks following your procedure, please contact their office.

## 2024-03-02 LAB
ATRIAL RATE: 69 BPM
P AXIS: 27 DEGREES
P OFFSET: 202 MS
P ONSET: 148 MS
PR INTERVAL: 154 MS
Q ONSET: 225 MS
QRS COUNT: 11 BEATS
QRS DURATION: 86 MS
QT INTERVAL: 480 MS
QTC CALCULATION(BAZETT): 514 MS
QTC FREDERICIA: 503 MS
R AXIS: -7 DEGREES
T AXIS: 36 DEGREES
T OFFSET: 465 MS
VENTRICULAR RATE: 69 BPM

## 2024-03-03 LAB — ACT BLD: 283 SEC (ref 89–169)

## 2024-03-04 ENCOUNTER — TELEPHONE (OUTPATIENT)
Dept: PRIMARY CARE | Facility: CLINIC | Age: 54
End: 2024-03-04
Payer: COMMERCIAL

## 2024-03-04 DIAGNOSIS — I25.10 CORONARY ARTERY DISEASE INVOLVING NATIVE HEART WITHOUT ANGINA PECTORIS, UNSPECIFIED VESSEL OR LESION TYPE: Primary | ICD-10-CM

## 2024-03-04 DIAGNOSIS — Z12.31 ENCOUNTER FOR SCREENING MAMMOGRAM FOR MALIGNANT NEOPLASM OF BREAST: ICD-10-CM

## 2024-03-04 RX ORDER — NITROGLYCERIN 0.4 MG/1
0.4 TABLET SUBLINGUAL EVERY 5 MIN PRN
Qty: 90 TABLET | Refills: 0 | Status: SHIPPED | OUTPATIENT
Start: 2024-03-04

## 2024-03-04 NOTE — TELEPHONE ENCOUNTER
Dr. Mcgill patient  Refill for nitroglycerin (Nitrostat) 0.4 mg SL tablet  [id number] - [place], [place] - [address]

## 2024-03-19 NOTE — SIGNIFICANT EVENT
Spoke with Pt; no complications and rates nursing care 10/10. Mentioned Patricia Penny RN and what wonderful care she received.

## 2024-03-29 ENCOUNTER — TRANSCRIBE ORDERS (OUTPATIENT)
Dept: WOMENS IMAGING | Age: 54
End: 2024-03-29

## 2024-03-29 ENCOUNTER — HOSPITAL ENCOUNTER (OUTPATIENT)
Dept: WOMENS IMAGING | Age: 54
Discharge: HOME | End: 2024-03-29
Payer: COMMERCIAL

## 2024-03-29 VITALS — BODY MASS INDEX: 41.06 KG/M2 | HEIGHT: 61 IN

## 2024-03-29 DIAGNOSIS — Z12.31 SCREENING MAMMOGRAM FOR BREAST CANCER: ICD-10-CM

## 2024-03-29 DIAGNOSIS — Z12.31 SCREENING MAMMOGRAM FOR BREAST CANCER: Primary | ICD-10-CM

## 2024-03-29 PROCEDURE — 77063 BREAST TOMOSYNTHESIS BI: CPT

## 2024-04-11 ENCOUNTER — OFFICE VISIT (OUTPATIENT)
Dept: PRIMARY CARE | Facility: CLINIC | Age: 54
End: 2024-04-11
Payer: COMMERCIAL

## 2024-04-11 VITALS
BODY MASS INDEX: 45.91 KG/M2 | SYSTOLIC BLOOD PRESSURE: 126 MMHG | DIASTOLIC BLOOD PRESSURE: 78 MMHG | HEART RATE: 74 BPM | WEIGHT: 243 LBS | OXYGEN SATURATION: 97 %

## 2024-04-11 DIAGNOSIS — D23.9 INTRADERMAL NEVUS: ICD-10-CM

## 2024-04-11 DIAGNOSIS — I10 PRIMARY HYPERTENSION: ICD-10-CM

## 2024-04-11 DIAGNOSIS — L82.1 SEBORRHEIC KERATOSES: Primary | ICD-10-CM

## 2024-04-11 PROCEDURE — 99213 OFFICE O/P EST LOW 20 MIN: CPT | Performed by: FAMILY MEDICINE

## 2024-04-11 PROCEDURE — 17110 DESTRUCTION B9 LES UP TO 14: CPT | Performed by: FAMILY MEDICINE

## 2024-04-11 NOTE — PROGRESS NOTES
Subjective   Patient ID: Turner Thakkar is a 54 y.o. female who presents for LUMP.    HPI Pt states she found a lump on her neck on the left side,denies pain.    Mammogram done 03/29/2024    Pt denies any other concerns today.        Review of Systems  12 Systems have been reviewed as follows.   Constitutional: Fever, weight gain, weight loss, appetite change, night sweats, fatigue, chills.  Eyes : blurry, double vision, vision, loss, tearing, redness, pain, sensitivity to light, glaucoma.  Ears, nose, mouth, and throat: Hearing loss, ringing in the ears, ear pain, nasal congestion, nasal drainage, nosebleeds, mouth, throat, irritation tooth problem.  Cardiovascular :chest pain, pressure, heart racing, palpitations, sweating, leg swelling, high or low blood pressure  Pulmonary: Cough, yellow or green sputum, blood and sputum, shortness of breath, wheezing  Gastrointestinal: Nausea, vomiting, diarrhea, constipation, pain, blood in stool, or vomitus, heartburn, difficulty swallowing  Genitourinary: incontinence, abnormal bleeding, abnormal discharge, urinary frequency, urinary hesitancy, pain, impotence sexual problem, infection, urinary retention  Musculoskeletal: Pain, stiffness, joint, redness or warmth, arthritis, back pain, weakness, muscle wasting, sprain or fracture  Neuro: Weight weakness, dizziness, change in voice, change in taste change in vision, change in hearing, loss, or change of sensation, trouble walking, balance problems coordination problems, shaking, speech problem  Endocrine , cold or heat intolerance, blood sugar problem, weight gain or loss missed periods hot flashes, sweats, change in body hair, change in libido, increased thirst, increased urination  Heme/lymph: Swelling, bleeding, problem anemia, bruising, enlarged lymph nodes  Allergic/immunologic: H. plus nasal drip, watery itchy eyes, nasal drainage, immunosuppressed  The above were reviewed and noted negative except as noted in HPI and  Problem List.    Objective   /78 (BP Location: Left arm, Patient Position: Sitting, BP Cuff Size: Adult)   Pulse 74   Wt 110 kg (243 lb)   SpO2 97%   BMI 45.91 kg/m²     Physical Exam  Constitutional: Well developed, well nourished, alert and in no acute distress  Eyes: Normal external exam. Pupils equally round and reactive to light with normal accommodation and extraocular movements intact.  Neck: Supple, no lymphadenopathy or masses.  Cardiovascular: Regular rate and rhythm, normal S1 and S2, no murmurs, gallops, or rubs. Radial pulses normal. No peripheral edema.  Abdomen: soft, non tender, distended, no masses or HSM.  Pulmonary: No respiratory distress, lungs clear to auscultation bilaterally. No wheezes, rhonchi, rales.  Skin: Warm, well perfused, normal skin turgor and color.  Neurologic: Cranial nerves II-XII grossly intact.  Psychiatric: Mood calm and affect normal  Musculoskeletal: Moving all extremities without restriction    Assessment/Plan   Problem List Items Addressed This Visit             ICD-10-CM    Hypertension I10    Intradermal nevus D23.9    Seborrheic keratoses - Primary L82.1     Scribe Attestation  By signing my name below, Jim BRANDT DOPatient ID: Turner Thakkar is a 54 y.o. female.    Destruction of lesion    Date/Time: 4/11/2024 1:05 PM    Performed by: Jim Mcgill DO  Authorized by: Jim Mcgill DO    Number of Lesions: 1  Lesion 1:     Body area: head/neck    Head/neck location: neck    Malignancy: benign lesion      Destruction method: cryotherapy     , Scribe   attest that this documentation has been prepared under the direction and in the presence of Jim Mcgill DO.    Provider Attestation - Scribe documentation    All medical record entries made by the Scribe were at my direction and personally dictated by me. I have reviewed the chart and agree that the record accurately reflects my personal performance of the history, physical exam, discussion and  plan.

## 2024-04-15 ENCOUNTER — OFFICE VISIT (OUTPATIENT)
Dept: CARDIOLOGY | Facility: CLINIC | Age: 54
End: 2024-04-15
Payer: COMMERCIAL

## 2024-04-15 VITALS
DIASTOLIC BLOOD PRESSURE: 82 MMHG | SYSTOLIC BLOOD PRESSURE: 142 MMHG | WEIGHT: 242.6 LBS | BODY MASS INDEX: 45.8 KG/M2 | HEART RATE: 80 BPM | HEIGHT: 61 IN

## 2024-04-15 DIAGNOSIS — I10 PRIMARY HYPERTENSION: ICD-10-CM

## 2024-04-15 DIAGNOSIS — R00.2 PALPITATIONS: ICD-10-CM

## 2024-04-15 DIAGNOSIS — E78.2 MIXED HYPERLIPIDEMIA: ICD-10-CM

## 2024-04-15 DIAGNOSIS — I5A NON-ISCHEMIC MYOCARDIAL INJURY (NON-TRAUMATIC): ICD-10-CM

## 2024-04-15 DIAGNOSIS — I25.10 CORONARY ARTERY DISEASE INVOLVING NATIVE HEART WITHOUT ANGINA PECTORIS, UNSPECIFIED VESSEL OR LESION TYPE: ICD-10-CM

## 2024-04-15 DIAGNOSIS — Z87.891 FORMER SMOKER: ICD-10-CM

## 2024-04-15 PROCEDURE — 99214 OFFICE O/P EST MOD 30 MIN: CPT | Performed by: INTERNAL MEDICINE

## 2024-04-15 PROCEDURE — 1036F TOBACCO NON-USER: CPT | Performed by: INTERNAL MEDICINE

## 2024-04-15 PROCEDURE — 4010F ACE/ARB THERAPY RXD/TAKEN: CPT | Performed by: INTERNAL MEDICINE

## 2024-04-15 PROCEDURE — 3008F BODY MASS INDEX DOCD: CPT | Performed by: INTERNAL MEDICINE

## 2024-04-15 PROCEDURE — 3079F DIAST BP 80-89 MM HG: CPT | Performed by: INTERNAL MEDICINE

## 2024-04-15 PROCEDURE — 3077F SYST BP >= 140 MM HG: CPT | Performed by: INTERNAL MEDICINE

## 2024-04-15 RX ORDER — METOPROLOL SUCCINATE 100 MG/1
100 TABLET, EXTENDED RELEASE ORAL NIGHTLY
Qty: 90 TABLET | Refills: 3 | Status: SHIPPED | OUTPATIENT
Start: 2024-04-15 | End: 2024-05-25

## 2024-04-15 NOTE — PROGRESS NOTES
Patient:  Turner Thakkar  YOB: 1970  MRN: [id number]       Chief Complaint/Active Symptoms:       Turner Thakkar is a 54 y.o. female who returns today for cardiac follow-up.  Patient doing well.  Blood pressure is a bit high however otherwise she is doing fine.  She did have a heart catheterization at the end of February after an abnormal nuclear scan additionally showed mild disease with normal LV function.  She remains morbidly obese.  She is on her usual meds but pressures running a bit high so organ to increase her metoprolol to 100 a day.      Objective:     Vitals:    04/15/24 1315   BP: 142/82   Pulse: 80       Vitals:    04/15/24 1315   Weight: 110 kg (242 lb 9.6 oz)       Allergies:     Allergies   Allergen Reactions    Oxycodone-Acetaminophen Palpitations     palpitations          Medications:     Current Outpatient Medications   Medication Instructions    albuterol (Proventil HFA) 90 mcg/actuation inhaler 2 puffs, inhalation, Every 4 hours PRN    amLODIPine (NORVASC) 10 mg, oral, Daily    ascorbic acid (VITAMIN C) 500 mg, oral, Daily    biotin 5 mg, oral    cholecalciferol (VITAMIN D-3) 25 mcg, oral, Daily    chromium picolinate 1,000 mcg tablet 1 tablet, oral, Daily    clopidogrel (PLAVIX) 75 mg, oral, Daily    dofetilide (TIKOSYN) 125 mcg, oral, Every 12 hours    econazole nitrate 1 % cream 1 Application, 2 times daily, Apply and gentaly massage into affected area(s) twice daily    Eliquis 5 mg, oral, 2 times daily    ezetimibe (ZETIA) 10 mg, oral, Daily    fluticasone (Flonase) 50 mcg/actuation nasal spray 1 spray, Each Nostril, Daily, PRN    Lactobacillus acidophilus (PROBIOTIC ORAL) 1 capsule, oral, Daily    lisinopril 40 mg tablet Take 1 tablet (40 mg) by mouth once daily.    magnesium oxide (MAG-OX) 800 mg, oral, Daily    metoprolol succinate XL (TOPROL-XL) 50 mg, oral, Nightly    nitroglycerin (NITROSTAT) 0.4 mg, sublingual, Every 5 min PRN    norethindrone (AYGESTIN) 5 mg, oral, Daily     Rybelsus 14 mg, oral, Daily    zinc gluconate 50 mg tablet 50 mg of elemental zinc, oral, Daily       Physical Examination:   Constitutional:       Appearance: Healthy appearance. Not in distress.   Neck:      Vascular: No JVR. JVD normal.   Pulmonary:      Effort: Pulmonary effort is normal.      Breath sounds: Normal breath sounds. No wheezing. No rhonchi. No rales.   Chest:      Chest wall: Not tender to palpatation.   Cardiovascular:      PMI at left midclavicular line. Normal rate. Regular rhythm. Normal S1. Normal S2.       Murmurs: There is no murmur.      No gallop.  No click. No rub.   Pulses:     Intact distal pulses.   Edema:     Peripheral edema absent.   Abdominal:      General: Bowel sounds are normal.      Palpations: Abdomen is soft.      Tenderness: There is no abdominal tenderness.   Musculoskeletal: Normal range of motion.         General: No tenderness. Skin:     General: Skin is warm and dry.   Neurological:      General: No focal deficit present.      Mental Status: Alert and oriented to person, place and time.            Lab:     CBC:   Lab Results   Component Value Date    WBC 6.9 02/27/2024    RBC 4.96 02/27/2024    HGB 15.4 02/27/2024    HCT 45.2 02/27/2024     02/27/2024        CMP:    Lab Results   Component Value Date     02/27/2024    K 4.2 02/27/2024     02/27/2024    CO2 24 02/27/2024    BUN 10 02/27/2024    CREATININE 0.81 02/27/2024    GLUCOSE 189 (H) 02/27/2024    CALCIUM 9.6 02/27/2024       Magnesium:    Lab Results   Component Value Date    MG 2.23 08/14/2023       Lipid Profile:    Lab Results   Component Value Date    TRIG 123 06/24/2023    HDL 44.1 06/24/2023       TSH:    Lab Results   Component Value Date    TSH 1.15 08/14/2023       BNP:   Lab Results   Component Value Date    BNP 18 08/14/2023        PT/INR:    Lab Results   Component Value Date    PROTIME 14.0 (H) 02/27/2024    INR 1.2 (H) 02/27/2024       HgBA1c:    Lab Results   Component Value Date     HGBA1C 8.2 (H) 03/06/2024       BMP:  Lab Results   Component Value Date     02/27/2024     08/14/2023     02/05/2023     02/03/2023    K 4.2 02/27/2024    K 3.6 08/14/2023    K 4.1 02/05/2023    K 4.1 02/03/2023     02/27/2024     08/14/2023     02/05/2023     02/03/2023    CO2 24 02/27/2024    CO2 27 08/14/2023    CO2 24 02/05/2023    CO2 26 02/03/2023    BUN 10 02/27/2024    BUN 12 08/14/2023    BUN 14 02/05/2023    BUN 8 02/03/2023    CREATININE 0.81 02/27/2024    CREATININE 0.73 08/14/2023    CREATININE 0.75 02/05/2023    CREATININE 0.61 02/03/2023       CBC:  Lab Results   Component Value Date    WBC 6.9 02/27/2024    WBC 8.4 08/14/2023    WBC 7.0 02/05/2023    WBC 5.5 01/29/2023    RBC 4.96 02/27/2024    RBC 4.94 08/14/2023    RBC 4.84 02/05/2023    RBC 5.06 01/29/2023    HGB 15.4 02/27/2024    HGB 15.2 08/14/2023    HGB 14.7 02/05/2023    HGB 15.3 01/29/2023    HCT 45.2 02/27/2024    HCT 44.7 08/14/2023    HCT 41.9 02/05/2023    HCT 43.9 01/29/2023    MCV 91 02/27/2024    MCV 90 08/14/2023    MCV 87 02/05/2023    MCV 87 01/29/2023    MCH 31.0 02/27/2024    MCHC 34.1 02/27/2024    MCHC 34.0 08/14/2023    MCHC 35.1 02/05/2023    MCHC 34.9 01/29/2023    RDW 12.6 02/27/2024    RDW 12.8 08/14/2023    RDW 12.3 02/05/2023    RDW 12.3 01/29/2023     02/27/2024     08/14/2023     02/05/2023     01/29/2023       Cardiac Enzymes:    Lab Results   Component Value Date    TROPHS CANCELED 08/14/2023    TROPHS 8 08/14/2023    TROPHS 7 08/14/2023       Hepatic Function Panel:    Lab Results   Component Value Date    ALKPHOS 54 08/14/2023    ALT 18 08/14/2023    AST 20 08/14/2023    PROT 7.7 08/14/2023    BILITOT 0.5 08/14/2023         Diagnostic Studies:     ECG 12 lead STAT    Result Date: 3/2/2024  Normal sinus rhythm with sinus arrhythmia Prolonged QT Abnormal ECG Confirmed by Dylan Davis ([id number]) on 3/2/2024 2:25:53 PM        Cardiac Cath  "Post Procedure Notes:  Post Procedure Diagnosis: Double vessel disease.  Blood Loss:               Estimated blood loss during the procedure was 0 mls.  Specimens Removed:        Number of specimen(s) removed: none.     ____________________________________________________________________________________  CONCLUSIONS:   1. Left Main Coronary Artery: This artery is normal.   2. Left Anterior Descending Artery: presents luminal irregularities and contains patent previously placed stents.   3. Proximal LAD Lesion: The percent stenosis is 50%.   4. Circumflex Coronary Artery: presents luminal irregularities.   5. Mid CX Lesion: The percent stenosis is 50%.   6. Right Coronary Artery: presents luminal irregularities.   7. The Left Ventricular Ejection Fraction is 55%.     ICD 10 Codes:  Angina pectoris, unspecified-I20.9     CPT Codes:  Left Heart Cath (visualization of coronaries) and LV-15981; FFR, initial Vessel (PCI)-18365; Moderate Sedation Services 1st additional 15 minutes patient >5 years-33244; Moderate Sedation Services 2nd additional 15 minutes patient >5 years-66744     [id number] Adrian MD  Performing Physician  Electronically signed by Aurora Adrian MD on 2/29/2024 at 2:36:29 PM    EKG:   No results found for: \"EKG\"      Radiology:     No orders to display       Assessment/Plan:         Patient Active Problem List   Diagnosis    Amaurosis fugax of right eye    Anxiety    BPPV (benign paroxysmal positional vertigo)    Dyslipidemia    High serum vitamin E    Hypertension    Hypertrophy of nasal turbinates    CAD (coronary artery disease)    Claudication (CMS-HCC)    Fatigue    Flexor tendinitis of hand    Nasal septal deviation    Oligomenorrhea    MAYELA on CPAP    Palpitations    PVC (premature ventricular contraction)    Vertigo    Type 2 diabetes mellitus (Multi)    Transient visual loss of right eye    Stented coronary artery    SOBOE (shortness of breath on exertion)    BMI 40.0-44.9, adult (Multi)    " Pain of right upper extremity    Basal cell carcinoma    Flexor carpi ulnaris tendinitis    [place] nodes    Hyperlipidemia    Hypertriglyceridemia    OA (osteoarthritis)    PCOS (polycystic ovarian syndrome)    Renal cyst    Tinea corporis    Vitamin deficiency    Obesity, Class III, BMI 40-49.9 (morbid obesity) (Multi)    Lipid disorder    Elevated androgen levels    Sleep apnea    Dyspnea    Controlled type 2 diabetes mellitus without complication, without long-term current use of insulin (Multi)    Anticoagulant long-term use    Former smoker    Non-ischemic myocardial injury (non-traumatic)    Presence of stent in anterior descending branch of left coronary artery    Abnormal stress test    Intradermal nevus         ASSESSMENT       54-year-old female here for routine cardiovascular follow-up    Meds, vitals, examination as noted.    Chart reviewed in detail discussed with the patient at length.    Impression:  Mild CAD  Obesity  Type 2 diabetes  Paroxysmal A-fib  Long-term anticoagulation  Former smoker  Abnormal nuclear scan  PLAN   Recommendation:  Increase metoprolol to 100 daily  Continue other meds as before  Exercise and weight loss  Can discuss with the EP service about potential ablation therapy  Will maintain Plavix along with Eliquis  See me in 6 months

## 2024-04-15 NOTE — PATIENT INSTRUCTIONS
Continue same medications/treatment.  Patient educated on proper medication use.  Patient educated on risk factor modification.  Please bring any lab results from other providers/physicians to your next appointment.    Please bring all medicines, vitamins, and herbal supplements with you when you come to the office.    Prescriptions will not be filled unless you are compliant with your follow up appointments or have a follow up appointment scheduled as per instruction of your physician. Refills should be requested at the time of your visit.    Follow up in 4 weeks for blood pressure check with NP  Follow up in 6 months with Dr. Drea Urrutia DO   INCREASED Dose of Metoprolol 50 mg to Metoprolol 100 mg daily (take 2 of your 50 mg tabs until gone)    I, NIKKI MORGAN RN, AM SCRIBING FOR AND IN THE PRESENCE OF DR. DREA URRUTIA DO, FACC

## 2024-04-26 DIAGNOSIS — E11.9 TYPE 2 DIABETES MELLITUS WITHOUT COMPLICATION, UNSPECIFIED WHETHER LONG TERM INSULIN USE (MULTI): ICD-10-CM

## 2024-04-29 RX ORDER — ORAL SEMAGLUTIDE 14 MG/1
14 TABLET ORAL DAILY
Qty: 90 TABLET | Refills: 3 | Status: SHIPPED | OUTPATIENT
Start: 2024-04-29

## 2024-05-01 PROBLEM — L82.1 SEBORRHEIC KERATOSES: Status: ACTIVE | Noted: 2024-05-01

## 2024-05-10 DIAGNOSIS — I10 PRIMARY HYPERTENSION: ICD-10-CM

## 2024-05-13 NOTE — TELEPHONE ENCOUNTER
Left vm for pt. To call office and clarify is she needs this RX sent to [facility] instead of local pharmacy.

## 2024-05-16 ENCOUNTER — APPOINTMENT (OUTPATIENT)
Dept: CARDIOLOGY | Facility: CLINIC | Age: 54
End: 2024-05-16
Payer: COMMERCIAL

## 2024-05-24 NOTE — TELEPHONE ENCOUNTER
Received request for prescription refills for patient.   Patient follows with Dr. Ghada MD     Request is for Metoprolol 100mg once daily  Is patient currently on medication, YES- however, our current medication list shows Metoprolol Succinate 50mg once daily at bedtime.     **LM for patient to return call to clarify dose and how often she takes her Metoprolol. Please update refill request and send to provider once clarified.     Last OV 4/15/2024  Next OV 10/15/2024    Pended for signing and sent to provider

## 2024-05-24 NOTE — TELEPHONE ENCOUNTER
"Patient called and verified her bottle for her metoprolol prescription does day 100mg and she takes it once daily at bedtime. She stated this was changed by Dr. Dylan Urrutia, DO, [facility] at her last appointment on 4/15/24. Updated order to 100mg of metoprolol and routed to provider for signing.    PLAN   \"Recommendation:  Increase metoprolol to 100 daily  Continue other meds as before  Exercise and weight loss  Can discuss with the EP service about potential ablation therapy  Will maintain Plavix along with Eliquis  See me in 6 months\"  "

## 2024-05-25 RX ORDER — METOPROLOL SUCCINATE 100 MG/1
100 TABLET, EXTENDED RELEASE ORAL DAILY
Qty: 90 TABLET | Refills: 3 | Status: SHIPPED | OUTPATIENT
Start: 2024-05-25 | End: 2024-06-06 | Stop reason: SDUPTHER

## 2024-05-26 DIAGNOSIS — Z79.01 ANTICOAGULANT LONG-TERM USE: ICD-10-CM

## 2024-05-29 RX ORDER — APIXABAN 5 MG/1
5 TABLET, FILM COATED ORAL 2 TIMES DAILY
Qty: 60 TABLET | Refills: 3 | Status: SHIPPED | OUTPATIENT
Start: 2024-05-29

## 2024-06-06 ENCOUNTER — LAB (OUTPATIENT)
Dept: LAB | Facility: LAB | Age: 54
End: 2024-06-06
Payer: COMMERCIAL

## 2024-06-06 ENCOUNTER — OFFICE VISIT (OUTPATIENT)
Dept: CARDIOLOGY | Facility: CLINIC | Age: 54
End: 2024-06-06
Payer: COMMERCIAL

## 2024-06-06 VITALS
BODY MASS INDEX: 42.61 KG/M2 | HEIGHT: 63 IN | SYSTOLIC BLOOD PRESSURE: 110 MMHG | HEART RATE: 80 BPM | WEIGHT: 240.5 LBS | DIASTOLIC BLOOD PRESSURE: 70 MMHG

## 2024-06-06 DIAGNOSIS — I10 PRIMARY HYPERTENSION: ICD-10-CM

## 2024-06-06 DIAGNOSIS — I10 PRIMARY HYPERTENSION: Primary | ICD-10-CM

## 2024-06-06 LAB
ANION GAP SERPL CALC-SCNC: 13 MMOL/L (ref 10–20)
BUN SERPL-MCNC: 10 MG/DL (ref 6–23)
CALCIUM SERPL-MCNC: 9.8 MG/DL (ref 8.6–10.3)
CHLORIDE SERPL-SCNC: 103 MMOL/L (ref 98–107)
CO2 SERPL-SCNC: 26 MMOL/L (ref 21–32)
CREAT SERPL-MCNC: 0.76 MG/DL (ref 0.5–1.05)
EGFRCR SERPLBLD CKD-EPI 2021: >90 ML/MIN/1.73M*2
GLUCOSE SERPL-MCNC: 341 MG/DL (ref 74–99)
POTASSIUM SERPL-SCNC: 4.5 MMOL/L (ref 3.5–5.3)
SODIUM SERPL-SCNC: 137 MMOL/L (ref 136–145)

## 2024-06-06 PROCEDURE — 3008F BODY MASS INDEX DOCD: CPT | Performed by: NURSE PRACTITIONER

## 2024-06-06 PROCEDURE — 36415 COLL VENOUS BLD VENIPUNCTURE: CPT

## 2024-06-06 PROCEDURE — 3078F DIAST BP <80 MM HG: CPT | Performed by: NURSE PRACTITIONER

## 2024-06-06 PROCEDURE — 80048 BASIC METABOLIC PNL TOTAL CA: CPT

## 2024-06-06 PROCEDURE — 3074F SYST BP LT 130 MM HG: CPT | Performed by: NURSE PRACTITIONER

## 2024-06-06 PROCEDURE — 1036F TOBACCO NON-USER: CPT | Performed by: NURSE PRACTITIONER

## 2024-06-06 PROCEDURE — 99213 OFFICE O/P EST LOW 20 MIN: CPT | Performed by: NURSE PRACTITIONER

## 2024-06-06 PROCEDURE — 4010F ACE/ARB THERAPY RXD/TAKEN: CPT | Performed by: NURSE PRACTITIONER

## 2024-06-06 RX ORDER — METOPROLOL SUCCINATE 100 MG/1
100 TABLET, EXTENDED RELEASE ORAL DAILY
Qty: 90 TABLET | Refills: 3 | Status: SHIPPED | OUTPATIENT
Start: 2024-06-06 | End: 2025-06-06

## 2024-06-06 ASSESSMENT — ENCOUNTER SYMPTOMS
FEVER: 0
HEMOPTYSIS: 0
DYSPNEA ON EXERTION: 0
ORTHOPNEA: 0
NAIL CHANGES: 0
ENDOCRINE NEGATIVE: 1
HEMATOLOGIC/LYMPHATIC NEGATIVE: 1
COUGH: 0
IRREGULAR HEARTBEAT: 0
CONSTITUTIONAL NEGATIVE: 1
DECREASED APPETITE: 0
DIARRHEA: 0
PSYCHIATRIC NEGATIVE: 1
NAUSEA: 0
NEUROLOGICAL NEGATIVE: 1
SPUTUM PRODUCTION: 0
DIZZINESS: 0
VOMITING: 0
SHORTNESS OF BREATH: 0
COLOR CHANGE: 0
SYNCOPE: 0
EYES NEGATIVE: 1
SUSPICIOUS LESIONS: 0
ALLERGIC/IMMUNOLOGIC NEGATIVE: 1
VISUAL HALOS: 0
UNUSUAL HAIR DISTRIBUTION: 0
NUMBNESS: 0
LIGHT-HEADEDNESS: 0
MUSCULOSKELETAL NEGATIVE: 1
NEAR-SYNCOPE: 0
ABDOMINAL PAIN: 0
CLAUDICATION: 0
POOR WOUND HEALING: 0
PND: 0
CONSTIPATION: 0
PALPITATIONS: 0
CHILLS: 0
WHEEZING: 0
DIAPHORESIS: 0

## 2024-06-06 NOTE — PROGRESS NOTES
CARDIOLOGY OFFICE VISIT      CHIEF COMPLAINT  Chief Complaint   Patient presents with    Follow-up     PT is here today for BP check        HISTORY OF PRESENT ILLNESS  54-year-old female comes into the office for blood pressure check.  She states that her blood pressures have been very well at this time she has been having no complaints.  110/70  Denies chest pain, shortness of breath, nausea, vomiting, PND, orthopnea, claudications    Past Medical History  Past Medical History:   Diagnosis Date    Acute nasopharyngitis (common cold) 10/11/2019    Nasopharyngitis    Acute nasopharyngitis (common cold) 10/11/2019    Nasopharyngitis    ASHD (arteriosclerotic heart disease)     Body mass index (BMI) 45.0-49.9, adult (Multi) 11/09/2021    Body mass index (BMI) of 45.0-49.9 in adult    Chronic ethmoidal sinusitis 09/08/2020    Chronic ethmoidal sinusitis    Chronic ethmoidal sinusitis 10/20/2020    Chronic ethmoidal sinusitis    Chronic rhinitis     Rhinitis    Coronary artery disease     COVID-19 01/14/2022    COVID    Diabetes mellitus (Multi)     Essential (primary) hypertension     Hypertension, benign    Hyperlipidemia     Hypertrophy of nasal turbinates 10/20/2020    Hypertrophy, nasal, turbinate    Morbid (severe) obesity due to excess calories (Multi) 01/14/2022    Morbid obesity with BMI of 40.0-44.9, adult    Morbid (severe) obesity due to excess calories (Multi)     Class 3 severe obesity due to excess calories with serious comorbidity and body mass index (BMI) of 45.0 to 49.9 in adult    Nasal congestion 08/08/2020    Nasal congestion with rhinorrhea    MAYELA on CPAP     Other abnormal glucose 10/07/2021    Elevated glucose    Other specified disorders of nose and nasal sinuses 09/08/2020    Nasal hypertrophy    Personal history of nicotine dependence 02/10/2020    History of tobacco abuse    Personal history of other diseases of the circulatory system 01/19/2022    History of atrial fibrillation     Personal history of other diseases of the nervous system and sense organs 11/12/2020    History of obstructive sleep apnea    Personal history of other diseases of the respiratory system 08/06/2020    History of allergic rhinitis    Personal history of other diseases of the respiratory system 09/08/2020    History of deviated nasal septum    Personal history of other diseases of the respiratory system 01/27/2020    History of upper respiratory infection    Personal history of other diseases of the respiratory system 03/18/2020    History of acute pharyngitis    Personal history of other diseases of the respiratory system 04/01/2020    History of acute bronchitis    Personal history of other endocrine, nutritional and metabolic disease 12/28/2021    History of morbid obesity    Personal history of other infectious and parasitic diseases 08/10/2021    History of tinea corporis    Personal history of other infectious and parasitic diseases 04/01/2020    History of candidiasis of mouth    Personal history of other infectious and parasitic diseases 04/01/2020    History of candidiasis of vagina    Personal history of other infectious and parasitic diseases 10/15/2019    History of candidiasis of vagina    Personal history of other specified conditions 12/28/2021    History of palpitations    Personal history of other specified conditions 08/06/2020    History of nasal congestion    Personal history of other specified conditions 09/08/2020    History of postnasal drip    Personal history of other specified conditions 02/18/2022    History of fatigue    Personal history of other specified conditions 01/25/2020    History of postnasal drip    Personal history of other specified conditions 06/23/2020    History of aphasia    Personal history of other specified conditions 01/31/2020    History of wheezing    Personal history of pneumonia (recurrent) 12/22/2021    History of pneumonia    Polyp of colon     Colorectal polyp  detected on colonoscopy    Rash and other nonspecific skin eruption 08/10/2021    Rash    Slurred speech     Deficit in communication due to slurred speech    Spontaneous ecchymoses 2021    Petechiae    Wheezing 2020    Expiratory wheezing       Social History  Social History     Tobacco Use    Smoking status: Former     Current packs/day: 0.00     Average packs/day: 1.5 packs/day for 30.0 years (45.0 ttl pk-yrs)     Types: Cigarettes     Start date: 1990     Quit date: 2020     Years since quittin.3    Smokeless tobacco: Never   Vaping Use    Vaping status: Never Used   Substance Use Topics    Alcohol use: Never    Drug use: Never       Family History     Family History   Problem Relation Name Age of Onset    Diabetes Mother      Hypertension Mother      Other (presence of stent in coronary artery) Mother      Other (presence of stent in coronary artery) Father      Other (CABG) Father      Diabetes Father          Allergies:  Allergies   Allergen Reactions    Oxycodone-Acetaminophen Palpitations     palpitations        Outpatient Medications:  Current Outpatient Medications   Medication Instructions    albuterol (Proventil HFA) 90 mcg/actuation inhaler 2 puffs, inhalation, Every 4 hours PRN    amLODIPine (NORVASC) 10 mg, oral, Daily    ascorbic acid (VITAMIN C) 500 mg, oral, Daily    biotin 5 mg, oral    cholecalciferol (VITAMIN D-3) 25 mcg, oral, Daily    chromium picolinate 1,000 mcg tablet 1 tablet, oral, Daily    clopidogrel (PLAVIX) 75 mg, oral, Daily    dofetilide (TIKOSYN) 125 mcg, oral, Every 12 hours    econazole nitrate 1 % cream 1 Application, 2 times daily, Apply and gentaly massage into affected area(s) twice daily    Eliquis 5 mg, oral, 2 times daily    ezetimibe (ZETIA) 10 mg, oral, Daily    fluticasone (Flonase) 50 mcg/actuation nasal spray 1 spray, Each Nostril, Daily, PRN    Lactobacillus acidophilus (PROBIOTIC ORAL) 1 capsule, oral, Daily    lisinopril 40 mg tablet Take  1 tablet (40 mg) by mouth once daily.    magnesium oxide (MAG-OX) 800 mg, oral, Daily    metoprolol succinate XL (TOPROL-XL) 100 mg, oral, Daily    nitroglycerin (NITROSTAT) 0.4 mg, sublingual, Every 5 min PRN    norethindrone (AYGESTIN) 5 mg, oral, Daily    Rybelsus 14 mg, oral, Daily    zinc gluconate 50 mg tablet 50 mg of elemental zinc, oral, Daily          REVIEW OF SYSTEMS  Review of Systems   Constitutional: Negative. Negative for chills, decreased appetite, diaphoresis, fever and malaise/fatigue.   HENT: Negative.     Eyes: Negative.  Negative for visual disturbance and visual halos.   Cardiovascular:  Negative for chest pain, claudication, cyanosis, dyspnea on exertion, irregular heartbeat, leg swelling, near-syncope, orthopnea, palpitations, paroxysmal nocturnal dyspnea and syncope.   Respiratory:  Negative for cough, hemoptysis, shortness of breath, sputum production and wheezing.    Endocrine: Negative.    Hematologic/Lymphatic: Negative.    Skin:  Negative for color change, dry skin, flushing, itching, nail changes, poor wound healing, rash, skin cancer, suspicious lesions and unusual hair distribution.   Musculoskeletal: Negative.    Gastrointestinal:  Negative for abdominal pain, constipation, diarrhea, nausea and vomiting.   Genitourinary: Negative.    Neurological: Negative.  Negative for dizziness, light-headedness and numbness.   Psychiatric/Behavioral: Negative.     Allergic/Immunologic: Negative.          VITALS  Vitals:    06/06/24 1257   BP: 110/70   Pulse: 80       PHYSICAL EXAM  Constitutional:       Appearance: Not in distress.   Eyes:      Pupils: Pupils are equal, round, and reactive to light.   HENT:      Head: Normocephalic.   Neck:      Thyroid: No thyroid mass.      Vascular: JVD normal.   Pulmonary:      Effort: Pulmonary effort is normal. No tachypnea or bradypnea.      Breath sounds: Normal breath sounds.   Chest:      Chest wall: Not tender to palpatation.   Cardiovascular:       PMI at left midclavicular line. Normal rate. Regular rhythm. Normal S1. Normal S2.       Murmurs: There is no murmur.      No gallop.  No click. No rub.   Pulses:     Intact distal pulses.   Abdominal:      General: Bowel sounds are normal. There is no distension.      Palpations: Abdomen is soft.   Skin:     General: Skin is warm and dry.   Neurological:      Mental Status: Alert.   Psychiatric:         Behavior: Behavior is cooperative.           ASSESSMENT AND PLAN  Diagnoses and all orders for this visit:  Primary hypertension  -     Basic metabolic panel; Future  -     metoprolol succinate XL (Toprol-XL) 100 mg 24 hr tablet; Take 1 tablet (100 mg) by mouth once daily.      Plan  -Check a BMP today  -Toprol  mg daily sent to [facility]  -Norvasc 10 mg daily  -Plavix 75 mg daily  -Tikosyn 125 mcg every 12 hours  -Eliquis 5 mg p.o. twice daily  -Zetia 10 mg daily        Reynaldo Card CNP   [facility]      [unfilled]    **Disclaimer: This note was dictated by speech recognition, and every effort has been made to prevent any error in transcription, however minor errors may be present**

## 2024-07-05 ENCOUNTER — HOSPITAL ENCOUNTER (OUTPATIENT)
Dept: RADIOLOGY | Facility: CLINIC | Age: 54
Discharge: HOME | End: 2024-07-05
Payer: COMMERCIAL

## 2024-07-05 ENCOUNTER — OFFICE VISIT (OUTPATIENT)
Dept: PRIMARY CARE | Facility: CLINIC | Age: 54
End: 2024-07-05
Payer: COMMERCIAL

## 2024-07-05 VITALS
RESPIRATION RATE: 16 BRPM | DIASTOLIC BLOOD PRESSURE: 70 MMHG | HEART RATE: 78 BPM | WEIGHT: 242 LBS | BODY MASS INDEX: 45.69 KG/M2 | SYSTOLIC BLOOD PRESSURE: 102 MMHG | OXYGEN SATURATION: 98 % | TEMPERATURE: 98.2 F | HEIGHT: 61 IN

## 2024-07-05 DIAGNOSIS — R10.9 FLANK PAIN: Primary | ICD-10-CM

## 2024-07-05 DIAGNOSIS — R31.9 HEMATURIA, UNSPECIFIED TYPE: ICD-10-CM

## 2024-07-05 DIAGNOSIS — R10.9 FLANK PAIN: ICD-10-CM

## 2024-07-05 DIAGNOSIS — E66.01 CLASS 3 SEVERE OBESITY WITH BODY MASS INDEX (BMI) OF 45.0 TO 49.9 IN ADULT, UNSPECIFIED OBESITY TYPE, UNSPECIFIED WHETHER SERIOUS COMORBIDITY PRESENT (MULTI): ICD-10-CM

## 2024-07-05 LAB
POC APPEARANCE, URINE: ABNORMAL
POC BILIRUBIN, URINE: NEGATIVE
POC BLOOD, URINE: ABNORMAL
POC COLOR, URINE: YELLOW
POC GLUCOSE, URINE: ABNORMAL MG/DL
POC KETONES, URINE: NEGATIVE MG/DL
POC LEUKOCYTES, URINE: NEGATIVE
POC NITRITE,URINE: NEGATIVE
POC PH, URINE: 5.5 PH
POC PROTEIN, URINE: ABNORMAL MG/DL
POC SPECIFIC GRAVITY, URINE: >=1.03
POC UROBILINOGEN, URINE: 0.2 EU/DL

## 2024-07-05 PROCEDURE — 76770 US EXAM ABDO BACK WALL COMP: CPT

## 2024-07-05 PROCEDURE — 81003 URINALYSIS AUTO W/O SCOPE: CPT | Performed by: NURSE PRACTITIONER

## 2024-07-05 PROCEDURE — 99213 OFFICE O/P EST LOW 20 MIN: CPT | Performed by: NURSE PRACTITIONER

## 2024-07-05 RX ORDER — NITROFURANTOIN 25; 75 MG/1; MG/1
100 CAPSULE ORAL 2 TIMES DAILY
Qty: 14 CAPSULE | Refills: 0 | Status: SHIPPED | OUTPATIENT
Start: 2024-07-05 | End: 2024-07-12

## 2024-07-05 ASSESSMENT — ENCOUNTER SYMPTOMS
FEVER: 0
VOMITING: 0
FLANK PAIN: 1
NAUSEA: 0
DYSURIA: 0
HEADACHES: 0
PAIN: 1
LOSS OF SENSATION IN FEET: 0
DIARRHEA: 0
ABDOMINAL PAIN: 0
CONSTIPATION: 0
DIZZINESS: 0
FREQUENCY: 0
CHILLS: 0
WEAKNESS: 0
DEPRESSION: 0
OCCASIONAL FEELINGS OF UNSTEADINESS: 0
FATIGUE: 0

## 2024-07-05 ASSESSMENT — PATIENT HEALTH QUESTIONNAIRE - PHQ9
SUM OF ALL RESPONSES TO PHQ9 QUESTIONS 1 AND 2: 0
2. FEELING DOWN, DEPRESSED OR HOPELESS: NOT AT ALL
1. LITTLE INTEREST OR PLEASURE IN DOING THINGS: NOT AT ALL

## 2024-07-05 NOTE — PROGRESS NOTES
"Subjective   Patient ID: Turner Thakkar is a 54 y.o. female who presents for Pain.    Patient is coming in complaining of kidney pain. She states the pain started on Monday, there is no other symptoms with this pain.    Pain  Episode onset: 5 days. Pertinent negatives include no abdominal pain, constipation, diarrhea, dysuria, fatigue, fever, headaches, nausea, rash, vomiting or weakness.     54-year-old female presents today complaining of right flank pain that started 4 days ago.  She states that her pain has improved.  She is now only experiencing slight twinges of pain, but wanted to get checked.  She denies any radiation of the pain.  No fever or chills.  She denies any dysuria or hematuria.  No history of renal stones, she believes being told in the past that she had a cyst on her right kidney that was benign.    Review of Systems   Constitutional:  Negative for chills, fatigue and fever.   Gastrointestinal:  Negative for abdominal pain, constipation, diarrhea, nausea and vomiting.   Genitourinary:  Positive for flank pain. Negative for dysuria, frequency and urgency.   Skin:  Negative for rash.   Neurological:  Negative for dizziness, weakness and headaches.       Objective   /70 (BP Location: Right arm, Patient Position: Sitting, BP Cuff Size: Large adult)   Pulse 78   Temp 36.8 °C (98.2 °F) (Temporal)   Resp 16   Ht 1.549 m (5' 1\")   Wt 110 kg (242 lb)   SpO2 98%   BMI 45.73 kg/m²     Physical Exam  Vitals and nursing note reviewed.   Cardiovascular:      Rate and Rhythm: Normal rate and regular rhythm.      Heart sounds: Normal heart sounds.   Pulmonary:      Effort: Pulmonary effort is normal.      Breath sounds: Normal breath sounds.   Abdominal:      General: Bowel sounds are normal.      Palpations: Abdomen is soft.      Tenderness: There is no abdominal tenderness. There is right CVA tenderness. There is no left CVA tenderness, guarding or rebound.   Musculoskeletal:      Right lower leg: No " edema.      Left lower leg: No edema.   Skin:     General: Skin is warm and dry.   Psychiatric:         Mood and Affect: Mood normal.         Behavior: Behavior normal.         Assessment/Plan   Problem List Items Addressed This Visit    None  Visit Diagnoses         Codes    Flank pain    -  Primary R10.9    Relevant Medications    nitrofurantoin, macrocrystal-monohydrate, (Macrobid) 100 mg capsule    Other Relevant Orders    POCT UA Automated manually resulted (Completed)    US renal complete    Urine Culture    Hematuria, unspecified type     R31.9    Class 3 severe obesity with body mass index (BMI) of 45.0 to 49.9 in adult, unspecified obesity type, unspecified whether serious comorbidity present (Multi)     E66.01, Z68.42        UA positive for glucose, trace blood and protein.   Urine culture pending.   Start macrobid as directed. Check renal us and follow up with results.  Increase rest and fluids.   If developing any worsening symptoms, pain, fever/chills, proceed to ER.

## 2024-07-08 RX ORDER — NITROFURANTOIN 25; 75 MG/1; MG/1
100 CAPSULE ORAL 2 TIMES DAILY
Qty: 14 CAPSULE | Refills: 0 | OUTPATIENT
Start: 2024-07-08 | End: 2024-07-15

## 2024-07-12 ENCOUNTER — APPOINTMENT (OUTPATIENT)
Dept: CARDIOLOGY | Facility: CLINIC | Age: 54
End: 2024-07-12
Payer: COMMERCIAL

## 2024-07-12 VITALS
WEIGHT: 239 LBS | DIASTOLIC BLOOD PRESSURE: 78 MMHG | HEART RATE: 75 BPM | BODY MASS INDEX: 45.12 KG/M2 | SYSTOLIC BLOOD PRESSURE: 122 MMHG | HEIGHT: 61 IN

## 2024-07-12 DIAGNOSIS — E78.2 MIXED HYPERLIPIDEMIA: ICD-10-CM

## 2024-07-12 DIAGNOSIS — I5A NON-ISCHEMIC MYOCARDIAL INJURY (NON-TRAUMATIC): ICD-10-CM

## 2024-07-12 DIAGNOSIS — R06.09 DYSPNEA ON EXERTION: ICD-10-CM

## 2024-07-12 DIAGNOSIS — R00.2 PALPITATIONS: ICD-10-CM

## 2024-07-12 DIAGNOSIS — Z95.5 STENTED CORONARY ARTERY: ICD-10-CM

## 2024-07-12 DIAGNOSIS — E78.5 DYSLIPIDEMIA: ICD-10-CM

## 2024-07-12 DIAGNOSIS — R06.02 SOBOE (SHORTNESS OF BREATH ON EXERTION): ICD-10-CM

## 2024-07-12 DIAGNOSIS — I25.10 CORONARY ARTERY DISEASE INVOLVING NATIVE HEART WITHOUT ANGINA PECTORIS, UNSPECIFIED VESSEL OR LESION TYPE: ICD-10-CM

## 2024-07-12 DIAGNOSIS — Z87.891 FORMER SMOKER: ICD-10-CM

## 2024-07-12 DIAGNOSIS — I48.0 PAROXYSMAL ATRIAL FIBRILLATION (MULTI): Primary | ICD-10-CM

## 2024-07-12 DIAGNOSIS — R53.83 OTHER FATIGUE: ICD-10-CM

## 2024-07-12 DIAGNOSIS — Z95.5 PRESENCE OF STENT IN ANTERIOR DESCENDING BRANCH OF LEFT CORONARY ARTERY: ICD-10-CM

## 2024-07-12 DIAGNOSIS — I10 PRIMARY HYPERTENSION: ICD-10-CM

## 2024-07-12 DIAGNOSIS — F41.9 ANXIETY: ICD-10-CM

## 2024-07-12 DIAGNOSIS — Z79.01 ANTICOAGULANT LONG-TERM USE: ICD-10-CM

## 2024-07-12 DIAGNOSIS — G47.33 OBSTRUCTIVE SLEEP APNEA SYNDROME: ICD-10-CM

## 2024-07-12 PROBLEM — E66.01 OBESITY, CLASS III, BMI 40-49.9 (MORBID OBESITY) (MULTI): Status: RESOLVED | Noted: 2022-02-23 | Resolved: 2024-07-12

## 2024-07-12 PROBLEM — E66.813 OBESITY, CLASS III, BMI 40-49.9 (MORBID OBESITY): Status: RESOLVED | Noted: 2022-02-23 | Resolved: 2024-07-12

## 2024-07-12 PROCEDURE — 3074F SYST BP LT 130 MM HG: CPT | Performed by: NURSE PRACTITIONER

## 2024-07-12 PROCEDURE — 3008F BODY MASS INDEX DOCD: CPT | Performed by: NURSE PRACTITIONER

## 2024-07-12 PROCEDURE — 4010F ACE/ARB THERAPY RXD/TAKEN: CPT | Performed by: NURSE PRACTITIONER

## 2024-07-12 PROCEDURE — 3078F DIAST BP <80 MM HG: CPT | Performed by: NURSE PRACTITIONER

## 2024-07-12 PROCEDURE — 99213 OFFICE O/P EST LOW 20 MIN: CPT | Performed by: NURSE PRACTITIONER

## 2024-07-12 NOTE — PROGRESS NOTES
"CARDIOLOGY OFFICE VISIT      CHIEF COMPLAINT  Chief Complaint   Patient presents with    Palpitations   Chief complaint: \"I need to lose some weight.\"    HISTORY OF PRESENT ILLNESS  HPI  History: The patient is a 54-year-old  female who is followed for paroxysmal atrial fibrillation controlled on dofetilide, metoprolol, magnesium oxide, and anticoagulated with Eliquis.  She has a history of hypertension, dyslipidemia, intermittent claudication, obstructive sleep apnea on CPAP, coronary artery disease status post angioplasty with drug-eluting stent to the proximal LAD with left heart catheterization dated January 31, 2023 recommending medical management with normal left ventricular function.  She states that she is fatigued and is not exercising.  She states that she is contemplating gastric bypass surgery.  She states her sister underwent the surgery and lost over 100 pounds.  Patient denies chest pain, palpitations, dizziness, lightheadedness, near or krishan syncope, shortness of breath, abdominal distention, or lower extremity edema.  Past Medical History  Past Medical History:   Diagnosis Date    Acute nasopharyngitis (common cold) 10/11/2019    Nasopharyngitis    Acute nasopharyngitis (common cold) 10/11/2019    Nasopharyngitis    ASHD (arteriosclerotic heart disease)     Body mass index (BMI) 45.0-49.9, adult (Multi) 11/09/2021    Body mass index (BMI) of 45.0-49.9 in adult    Chronic ethmoidal sinusitis 09/08/2020    Chronic ethmoidal sinusitis    Chronic ethmoidal sinusitis 10/20/2020    Chronic ethmoidal sinusitis    Chronic rhinitis     Rhinitis    Coronary artery disease     COVID-19 01/14/2022    COVID    Diabetes mellitus (Multi)     Essential (primary) hypertension     Hypertension, benign    Hyperlipidemia     Hypertrophy of nasal turbinates 10/20/2020    Hypertrophy, nasal, turbinate    Morbid (severe) obesity due to excess calories (Multi) 01/14/2022    Morbid obesity with BMI of 40.0-44.9, " adult    Morbid (severe) obesity due to excess calories (Multi)     Class 3 severe obesity due to excess calories with serious comorbidity and body mass index (BMI) of 45.0 to 49.9 in adult    Nasal congestion 08/08/2020    Nasal congestion with rhinorrhea    MAYELA on CPAP     Other abnormal glucose 10/07/2021    Elevated glucose    Other specified disorders of nose and nasal sinuses 09/08/2020    Nasal hypertrophy    Personal history of nicotine dependence 02/10/2020    History of tobacco abuse    Personal history of other diseases of the circulatory system 01/19/2022    History of atrial fibrillation    Personal history of other diseases of the nervous system and sense organs 11/12/2020    History of obstructive sleep apnea    Personal history of other diseases of the respiratory system 08/06/2020    History of allergic rhinitis    Personal history of other diseases of the respiratory system 09/08/2020    History of deviated nasal septum    Personal history of other diseases of the respiratory system 01/27/2020    History of upper respiratory infection    Personal history of other diseases of the respiratory system 03/18/2020    History of acute pharyngitis    Personal history of other diseases of the respiratory system 04/01/2020    History of acute bronchitis    Personal history of other endocrine, nutritional and metabolic disease 12/28/2021    History of morbid obesity    Personal history of other infectious and parasitic diseases 08/10/2021    History of tinea corporis    Personal history of other infectious and parasitic diseases 04/01/2020    History of candidiasis of mouth    Personal history of other infectious and parasitic diseases 04/01/2020    History of candidiasis of vagina    Personal history of other infectious and parasitic diseases 10/15/2019    History of candidiasis of vagina    Personal history of other specified conditions 12/28/2021    History of palpitations    Personal history of other  specified conditions 2020    History of nasal congestion    Personal history of other specified conditions 2020    History of postnasal drip    Personal history of other specified conditions 2022    History of fatigue    Personal history of other specified conditions 2020    History of postnasal drip    Personal history of other specified conditions 2020    History of aphasia    Personal history of other specified conditions 2020    History of wheezing    Personal history of pneumonia (recurrent) 2021    History of pneumonia    Polyp of colon     Colorectal polyp detected on colonoscopy    Rash and other nonspecific skin eruption 08/10/2021    Rash    Slurred speech     Deficit in communication due to slurred speech    Spontaneous ecchymoses 2021    Petechiae    Wheezing 2020    Expiratory wheezing       Social History  Social History     Tobacco Use    Smoking status: Former     Current packs/day: 0.00     Average packs/day: 1.5 packs/day for 30.0 years (45.0 ttl pk-yrs)     Types: Cigarettes     Start date: 1990     Quit date: 2020     Years since quittin.4    Smokeless tobacco: Never   Vaping Use    Vaping status: Never Used   Substance Use Topics    Alcohol use: Never    Drug use: Never       Family History     Family History   Problem Relation Name Age of Onset    Diabetes Mother      Hypertension Mother      Other (presence of stent in coronary artery) Mother      Other (presence of stent in coronary artery) Father      Other (CABG) Father      Diabetes Father          Allergies:  Allergies   Allergen Reactions    Oxycodone-Acetaminophen Palpitations     palpitations        Outpatient Medications:  Current Outpatient Medications   Medication Instructions    albuterol (Proventil HFA) 90 mcg/actuation inhaler 2 puffs, inhalation, Every 4 hours PRN    amLODIPine (NORVASC) 10 mg, oral, Daily    ascorbic acid (VITAMIN C) 500 mg, oral, Daily     biotin 5 mg, oral    cholecalciferol (VITAMIN D-3) 25 mcg, oral, Daily    chromium picolinate 1,000 mcg tablet 1 tablet, oral, Daily    clopidogrel (PLAVIX) 75 mg, oral, Daily    dofetilide (TIKOSYN) 125 mcg, oral, Every 12 hours    econazole nitrate 1 % cream 1 Application, 2 times daily, Apply and gentaly massage into affected area(s) twice daily    Eliquis 5 mg, oral, 2 times daily    ezetimibe (ZETIA) 10 mg, oral, Daily    fluticasone (Flonase) 50 mcg/actuation nasal spray 1 spray, Each Nostril, Daily, PRN    Lactobacillus acidophilus (PROBIOTIC ORAL) 1 capsule, oral, Daily    lisinopril 40 mg tablet Take 1 tablet (40 mg) by mouth once daily.    magnesium oxide (MAG-OX) 800 mg, oral, Daily    metoprolol succinate XL (TOPROL-XL) 100 mg, oral, Daily    nitrofurantoin, macrocrystal-monohydrate, (Macrobid) 100 mg capsule 100 mg, oral, 2 times daily    nitroglycerin (NITROSTAT) 0.4 mg, sublingual, Every 5 min PRN    norethindrone (AYGESTIN) 5 mg, oral, Daily    Rybelsus 14 mg, oral, Daily    zinc gluconate 50 mg tablet 50 mg of elemental zinc, oral, Daily          REVIEW OF SYSTEMS  Review of Systems   All other systems reviewed and are negative.        VITALS  Vitals:    07/12/24 1005   BP: 122/78   Pulse: 75       PHYSICAL EXAM  Vitals and nursing note reviewed.   Constitutional:       Appearance: Normal appearance.   HENT:      Head: Normocephalic.   Neck:      Vascular: No JVD. Carotid upstrokes II/IV.  Cardiovascular:      Rate and Rhythm: Normal rate and regular rhythm.      Pulses: Normal pulses.      Heart sounds: Normal S1 S2, no S3 S4.  No murmurs or rubs.  Pulmonary:      Effort: Pulmonary effort is normal. Respirations regular and nonlabored.     Breath sounds: Clear to auscultation posterior laterally.  Abdominal:      General: Bowel sounds are normal.      Palpations: Abdomen is soft.   Musculoskeletal:         General: Normal range of motion.      Cervical back: Normal range of motion.   Skin:      General: Skin is warm and dry.   Neurological:      General: No focal deficit present.      Mental Status: Alert and oriented to person, place, and time.      Motor: Motor function is intact.   Psychiatric:         Attention and Perception: Attention and perception normal.         Mood and Affect: Mood and affect normal.         Speech: Speech normal.         Behavior: Behavior normal. Behavior is cooperative.         Thought Content: Thought content normal.         Cognition and Memory: Cognition and memory normal.     Labs and testing: Twelve-lead EKG reveals sinus rhythm without ectopics no acute ischemic changes.  QRS durations 80 ms,  ms, QTc 506 ms.  2D echocardiogram dated February 15, 2024 reveals an ejection fraction of 55 to 60%, mild MR and TR.  Left heart catheterization dated February 29, 2024 reveals an ejection fraction of 55%, 50% proximal LAD with patent stents, 50% mid circumflex with luminal irregularities.      ASSESSMENT AND PLAN      Clinical impressions:  1. Paroxysmal atrial fibrillation controlled on dofetilide, metoprolol, magnesium oxide, and anticoagulated with Eliquis.  2. Hypertension, controlled with a blood pressure today of 122/78.  3. Diabetes mellitus.  4. Asthma.  5. Dyslipidemia on statin.  6. Morbid obesity with a BMI of 45.16.  7. Obstructive sleep apnea on CPAP.  8. Anxiety disorder.  9. Coronary artery disease left heart catheterization dated February 29, 2024 revealing an ejection fraction of 50% with 50% proximal LAD and mid circumflex stenosis and patent stents in the LAD and luminal irregularities of the circumflex coronary artery with recommendation for medical management.    10. Left ventricular ejection fraction of 60 to 65% per 2D echocardiogram dated January 30, 2023.  11. Left atrial enlargement the left atrial size of 4.6 cm per 2D echocardiogram dated January 30, 2023.  12. Pulmonary hypertension with right ventricular systolic pressure 28 mmHg per 2D  echocardiogram dated January 30, 2023.       Recommendations:  1.  Continue current medications as prescribed.  2.  Continue lifestyle modifications as discussed.  We discussed increasing activity and exercise for weight loss.  Patient states that she is still trying to decide about undergoing gastric bypass surgery.  3.  Follow-up in office with Dr. Urrutia on October 15, 2024 at 12 PM as scheduled.  4.  Follow-up in office with Dr. Miranda in 6 months or sooner if needed.    Evaluation and note by Jordyn Arechiga, CNP  **Please excuse any errors in grammar or translation related to this dictation.  Voice recognition software was utilized to prepare this document.**

## 2024-07-30 DIAGNOSIS — E78.5 DYSLIPIDEMIA: ICD-10-CM

## 2024-07-30 DIAGNOSIS — I49.3 PVC (PREMATURE VENTRICULAR CONTRACTION): ICD-10-CM

## 2024-07-30 RX ORDER — DOFETILIDE 0.12 MG/1
125 CAPSULE ORAL EVERY 12 HOURS
Qty: 60 CAPSULE | Refills: 0 | Status: SHIPPED | OUTPATIENT
Start: 2024-07-30 | End: 2024-08-29

## 2024-07-30 RX ORDER — EZETIMIBE 10 MG/1
10 TABLET ORAL DAILY
Qty: 30 TABLET | Refills: 0 | Status: SHIPPED | OUTPATIENT
Start: 2024-07-30

## 2024-08-05 DIAGNOSIS — I10 PRIMARY HYPERTENSION: ICD-10-CM

## 2024-08-05 RX ORDER — LISINOPRIL 40 MG/1
TABLET ORAL
Qty: 90 TABLET | Refills: 3 | Status: SHIPPED | OUTPATIENT
Start: 2024-08-05

## 2024-08-06 DIAGNOSIS — I49.3 PVC (PREMATURE VENTRICULAR CONTRACTION): ICD-10-CM

## 2024-08-06 DIAGNOSIS — E78.5 DYSLIPIDEMIA: ICD-10-CM

## 2024-08-06 RX ORDER — EZETIMIBE 10 MG/1
10 TABLET ORAL DAILY
Qty: 30 TABLET | Refills: 0 | Status: SHIPPED | OUTPATIENT
Start: 2024-08-06 | End: 2024-08-08 | Stop reason: SDUPTHER

## 2024-08-06 RX ORDER — DOFETILIDE 0.12 MG/1
125 CAPSULE ORAL EVERY 12 HOURS
Qty: 60 CAPSULE | Refills: 0 | Status: SHIPPED | OUTPATIENT
Start: 2024-08-06 | End: 2024-08-08 | Stop reason: SDUPTHER

## 2024-08-06 NOTE — TELEPHONE ENCOUNTER
Pt needs refills on  dofetilide (Tikosyn) 125 mcg capsule   ezetimibe (Zetia) 10 mg tablet     [facility] - [place], [place] [address]

## 2024-08-07 DIAGNOSIS — I49.3 PVC (PREMATURE VENTRICULAR CONTRACTION): ICD-10-CM

## 2024-08-07 DIAGNOSIS — E78.5 DYSLIPIDEMIA: ICD-10-CM

## 2024-08-07 NOTE — TELEPHONE ENCOUNTER
Pt needs refills on  dofetilide (Tikosyn) 125 mcg capsule   ezetimibe (Zetia) 10 mg tablet      [facility] - [place], [place] - [facility]         Pt needs these resent to pharmacy with 90 day supply please. Will only be covered this way.

## 2024-08-09 RX ORDER — DOFETILIDE 0.12 MG/1
125 CAPSULE ORAL EVERY 12 HOURS
Qty: 180 CAPSULE | Refills: 0 | Status: SHIPPED | OUTPATIENT
Start: 2024-08-09

## 2024-08-09 RX ORDER — EZETIMIBE 10 MG/1
10 TABLET ORAL DAILY
Qty: 90 TABLET | Refills: 0 | Status: SHIPPED | OUTPATIENT
Start: 2024-08-09

## 2024-08-12 ENCOUNTER — TELEPHONE (OUTPATIENT)
Dept: PRIMARY CARE | Facility: CLINIC | Age: 54
End: 2024-08-12
Payer: COMMERCIAL

## 2024-08-12 NOTE — TELEPHONE ENCOUNTER
Patient of Dr. Mcgill  Last office visit 04/11/2024    Patient stated that this medication was prescribed by her gyno for not getting her period hormonal  issues    norethindrone (Aygestin) 5 mg tablet -- -- 7/12/2023 --    Sig - Route: Take 1 tablet (5 mg) by mouth once daily. - oral        [facility] [place]

## 2024-08-13 DIAGNOSIS — E28.2 PCOS (POLYCYSTIC OVARIAN SYNDROME): Primary | ICD-10-CM

## 2024-09-10 RX ORDER — NORETHINDRONE 5 MG/1
5 TABLET ORAL DAILY
Qty: 30 TABLET | Refills: 0 | Status: SHIPPED | OUTPATIENT
Start: 2024-09-10

## 2024-09-17 DIAGNOSIS — Z79.01 ANTICOAGULANT LONG-TERM USE: ICD-10-CM

## 2024-09-17 RX ORDER — APIXABAN 5 MG/1
5 TABLET, FILM COATED ORAL 2 TIMES DAILY
Qty: 60 TABLET | Refills: 3 | Status: SHIPPED | OUTPATIENT
Start: 2024-09-17

## 2024-09-20 ENCOUNTER — DOCUMENTATION (OUTPATIENT)
Dept: CARDIOLOGY | Facility: CLINIC | Age: 54
End: 2024-09-20
Payer: COMMERCIAL

## 2024-09-20 NOTE — PROGRESS NOTES
Received cardiac clearance from [facility] gastroenterology for an endoscopic procedure. Per Jordyn CNP, patient is a moderate cardiac risk and may hold Eliquis for 2 days prior to procedure. Form faxed back to [facility] gastro. Will scan a copy into the chart as well.

## 2024-09-24 ENCOUNTER — TELEPHONE (OUTPATIENT)
Dept: CARDIOLOGY | Facility: CLINIC | Age: 54
End: 2024-09-24
Payer: COMMERCIAL

## 2024-09-24 NOTE — TELEPHONE ENCOUNTER
Received preop clearance from [facility] for patient pending Endoscopy in near future.     Patient last seen with Dr. Ghada MD on 4/15/24:  54 y.o. female who returns today for cardiac follow-up.  Patient doing well.  Blood pressure is a bit high however otherwise she is doing fine.  She did have a heart catheterization at the end of February after an abnormal nuclear scan additionally showed mild disease with normal LV function.  She remains morbidly obese.  She is on her usual meds but pressures running a bit high so organ to increase her metoprolol to 100 a day.   Recommendation:  Increase metoprolol to 100 daily  Continue other meds as before  Exercise and weight loss  Can discuss with the EP service about potential ablation therapy  Will maintain Plavix along with Eliquis  See me in 6 months    Patient cleared from EP service on 9/20/24- advised to hold Eliquis x2 days preop.     Form placed for Dr. Urrutia review today in clinic.

## 2024-09-24 NOTE — TELEPHONE ENCOUNTER
Per Dr. Dylan Urrutia, DO patient is cleared for procedure. Patient may hold Plavis for 6 days prior to procedure and Eliquis for 2 days prior to procedure.  Faxed to [facility] [phone]

## 2024-10-15 ENCOUNTER — APPOINTMENT (OUTPATIENT)
Dept: CARDIOLOGY | Facility: CLINIC | Age: 54
End: 2024-10-15
Payer: COMMERCIAL

## 2024-10-15 VITALS
WEIGHT: 235.5 LBS | HEIGHT: 61 IN | SYSTOLIC BLOOD PRESSURE: 120 MMHG | HEART RATE: 80 BPM | DIASTOLIC BLOOD PRESSURE: 68 MMHG | BODY MASS INDEX: 44.46 KG/M2

## 2024-10-15 DIAGNOSIS — E66.01 CLASS 3 SEVERE OBESITY DUE TO EXCESS CALORIES WITH SERIOUS COMORBIDITY AND BODY MASS INDEX (BMI) OF 40.0 TO 44.9 IN ADULT: ICD-10-CM

## 2024-10-15 DIAGNOSIS — I5A NON-ISCHEMIC MYOCARDIAL INJURY (NON-TRAUMATIC): ICD-10-CM

## 2024-10-15 DIAGNOSIS — Z95.5 STENTED CORONARY ARTERY: ICD-10-CM

## 2024-10-15 DIAGNOSIS — I49.3 PVC (PREMATURE VENTRICULAR CONTRACTION): ICD-10-CM

## 2024-10-15 DIAGNOSIS — I25.10 CORONARY ARTERY DISEASE INVOLVING NATIVE CORONARY ARTERY OF NATIVE HEART WITHOUT ANGINA PECTORIS: ICD-10-CM

## 2024-10-15 DIAGNOSIS — Z95.5 PRESENCE OF STENT IN ANTERIOR DESCENDING BRANCH OF LEFT CORONARY ARTERY: ICD-10-CM

## 2024-10-15 DIAGNOSIS — E66.813 CLASS 3 SEVERE OBESITY DUE TO EXCESS CALORIES WITH SERIOUS COMORBIDITY AND BODY MASS INDEX (BMI) OF 40.0 TO 44.9 IN ADULT: ICD-10-CM

## 2024-10-15 DIAGNOSIS — E11.00 TYPE 2 DIABETES MELLITUS WITH HYPEROSMOLARITY WITHOUT COMA, WITHOUT LONG-TERM CURRENT USE OF INSULIN (MULTI): ICD-10-CM

## 2024-10-15 DIAGNOSIS — G47.33 OSA ON CPAP: ICD-10-CM

## 2024-10-15 DIAGNOSIS — E78.5 DYSLIPIDEMIA: ICD-10-CM

## 2024-10-15 DIAGNOSIS — Z87.891 FORMER SMOKER: ICD-10-CM

## 2024-10-15 PROCEDURE — 99214 OFFICE O/P EST MOD 30 MIN: CPT | Performed by: INTERNAL MEDICINE

## 2024-10-15 PROCEDURE — 1036F TOBACCO NON-USER: CPT | Performed by: INTERNAL MEDICINE

## 2024-10-15 PROCEDURE — 3078F DIAST BP <80 MM HG: CPT | Performed by: INTERNAL MEDICINE

## 2024-10-15 PROCEDURE — 3074F SYST BP LT 130 MM HG: CPT | Performed by: INTERNAL MEDICINE

## 2024-10-15 PROCEDURE — 3008F BODY MASS INDEX DOCD: CPT | Performed by: INTERNAL MEDICINE

## 2024-10-15 PROCEDURE — 4010F ACE/ARB THERAPY RXD/TAKEN: CPT | Performed by: INTERNAL MEDICINE

## 2024-10-15 RX ORDER — ASPIRIN 81 MG/1
81 TABLET ORAL DAILY
COMMUNITY

## 2024-10-15 NOTE — PROGRESS NOTES
Patient:  Turner Thakkar  YOB: 1970  MRN: [id number]       Chief Complaint/Active Symptoms:       Turner Thakkar is a 54 y.o. female who returns today for cardiac follow-up.  Patient is doing well overall.  She just had a colonoscopy this week.  She has had any additional cardiac type symptoms or problems.  She had a heart cath in February showing only minimal disease.  This was predicated on abnormal stress nuclear scan.  She has had no other clinical changes or problems since her last visit.  Patient presents placement back in 2023 January and to the LAD but this vessel was patent at her cath done this past February.      Objective:     Vitals:    10/15/24 1206   BP: 120/68   Pulse: 80       Vitals:    10/15/24 1206   Weight: 107 kg (235 lb 8 oz)       Allergies:     Allergies   Allergen Reactions    Oxycodone-Acetaminophen Palpitations     palpitations          Medications:     Current Outpatient Medications   Medication Instructions    albuterol (Proventil HFA) 90 mcg/actuation inhaler 2 puffs, inhalation, Every 4 hours PRN    amLODIPine (NORVASC) 10 mg, oral, Daily    ascorbic acid (VITAMIN C) 500 mg, oral, Daily    aspirin 81 mg, oral, Daily    cholecalciferol (VITAMIN D-3) 25 mcg, oral, Daily    chromium picolinate 1,000 mcg tablet 1 tablet, oral, Daily    clopidogrel (PLAVIX) 75 mg, oral, Daily    dofetilide (TIKOSYN) 125 mcg, oral, Every 12 hours, Dispense as written    econazole nitrate 1 % cream 1 Application, 2 times daily, Apply and gentaly massage into affected area(s) twice daily    Eliquis 5 mg, oral, 2 times daily    ezetimibe (ZETIA) 10 mg, oral, Daily    fluticasone (Flonase) 50 mcg/actuation nasal spray 1 spray, Each Nostril, Daily, PRN    Lactobacillus acidophilus (PROBIOTIC ORAL) 1 capsule, oral, Daily    lisinopril 40 mg tablet TAKE 1 TABLET DAILY (INCREASED DOSE)    magnesium oxide (MAG-OX) 800 mg, oral, Daily    metoprolol succinate XL (TOPROL-XL) 100 mg, oral, Daily    nitroglycerin  (NITROSTAT) 0.4 mg, sublingual, Every 5 min PRN    norethindrone (AYGESTIN) 5 mg, oral, Daily    Rybelsus 14 mg, oral, Daily    zinc gluconate 50 mg tablet 50 mg of elemental zinc, oral, Daily       Physical Examination:   Constitutional:       Appearance: Healthy appearance. Not in distress.   Eyes:      Conjunctiva/sclera: Conjunctivae normal.      Pupils: Pupils are equal, round, and reactive to light.   Neck:      Vascular: No JVR. JVD normal.   Pulmonary:      Effort: Pulmonary effort is normal.      Breath sounds: Normal breath sounds. No wheezing. No rhonchi. No rales.   Chest:      Chest wall: Not tender to palpatation.   Cardiovascular:      PMI at left midclavicular line. Normal rate. Regular rhythm. Normal S1. Normal S2.       Murmurs: There is no murmur.      No gallop.  No click. No rub.   Pulses:     Intact distal pulses.   Edema:     Peripheral edema absent.   Abdominal:      Tenderness: There is no abdominal tenderness.   Musculoskeletal: Normal range of motion.         General: No tenderness.      Cervical back: Normal range of motion. Skin:     General: Skin is warm and dry.   Neurological:      General: No focal deficit present.      Mental Status: Alert and oriented to person, place and time.            Lab:     CBC:   Lab Results   Component Value Date    WBC 6.9 02/27/2024    RBC 4.96 02/27/2024    HGB 15.4 02/27/2024    HCT 45.2 02/27/2024     02/27/2024        CMP:    Lab Results   Component Value Date     06/06/2024    K 4.5 06/06/2024     06/06/2024    CO2 26 06/06/2024    BUN 10 06/06/2024    CREATININE 0.76 06/06/2024    GLUCOSE 341 (H) 06/06/2024    CALCIUM 9.8 06/06/2024       Magnesium:    Lab Results   Component Value Date    MG 2.23 08/14/2023       Lipid Profile:    Lab Results   Component Value Date    TRIG 123 06/24/2023    HDL 44.1 06/24/2023       TSH:    Lab Results   Component Value Date    TSH 1.15 08/14/2023       BNP:   Lab Results   Component Value Date     BNP 18 08/14/2023        PT/INR:    Lab Results   Component Value Date    PROTIME 14.0 (H) 02/27/2024    INR 1.2 (H) 02/27/2024       HgBA1c:    Lab Results   Component Value Date    HGBA1C 8.2 (H) 03/06/2024       BMP:  Lab Results   Component Value Date     06/06/2024     02/27/2024     08/14/2023     02/05/2023     02/03/2023    K 4.5 06/06/2024    K 4.2 02/27/2024    K 3.6 08/14/2023    K 4.1 02/05/2023    K 4.1 02/03/2023     06/06/2024     02/27/2024     08/14/2023     02/05/2023     02/03/2023    CO2 26 06/06/2024    CO2 24 02/27/2024    CO2 27 08/14/2023    CO2 24 02/05/2023    CO2 26 02/03/2023    BUN 10 06/06/2024    BUN 10 02/27/2024    BUN 12 08/14/2023    BUN 14 02/05/2023    BUN 8 02/03/2023    CREATININE 0.76 06/06/2024    CREATININE 0.81 02/27/2024    CREATININE 0.73 08/14/2023    CREATININE 0.75 02/05/2023    CREATININE 0.61 02/03/2023       CBC:  Lab Results   Component Value Date    WBC 6.9 02/27/2024    WBC 8.4 08/14/2023    WBC 7.0 02/05/2023    WBC 5.5 01/29/2023    RBC 4.96 02/27/2024    RBC 4.94 08/14/2023    RBC 4.84 02/05/2023    RBC 5.06 01/29/2023    HGB 15.4 02/27/2024    HGB 15.2 08/14/2023    HGB 14.7 02/05/2023    HGB 15.3 01/29/2023    HCT 45.2 02/27/2024    HCT 44.7 08/14/2023    HCT 41.9 02/05/2023    HCT 43.9 01/29/2023    MCV 91 02/27/2024    MCV 90 08/14/2023    MCV 87 02/05/2023    MCV 87 01/29/2023    MCH 31.0 02/27/2024    MCHC 34.1 02/27/2024    MCHC 34.0 08/14/2023    MCHC 35.1 02/05/2023    MCHC 34.9 01/29/2023    RDW 12.6 02/27/2024    RDW 12.8 08/14/2023    RDW 12.3 02/05/2023    RDW 12.3 01/29/2023     02/27/2024     08/14/2023     02/05/2023     01/29/2023       Cardiac Enzymes:    Lab Results   Component Value Date    TROPHS CANCELED 08/14/2023    TROPHS 8 08/14/2023    TROPHS 7 08/14/2023       Hepatic Function Panel:    Lab Results   Component Value Date    ALKPHOS 54  "08/14/2023    ALT 18 08/14/2023    AST 20 08/14/2023    PROT 7.7 08/14/2023    BILITOT 0.5 08/14/2023         Diagnostic Studies:     US renal complete    Result Date: 7/6/2024  Interpreted By:  Flako Cook, STUDY: US RENAL COMPLETE;  7/5/2024 4:55 pm   INDICATION: Signs/Symptoms:flank pain.   COMPARISON: None.   ACCESSION NUMBER(S): [id number]   ORDERING CLINICIAN: MARK NAGEL   TECHNIQUE: Multiple images of the kidneys were obtained  .   FINDINGS: RIGHT KIDNEY: The right kidney measures 13.1 cm in length. The renal cortical echogenicity and thickness are within normal limits. No hydronephrosis is present; no evidence of nephrolithiasis. Right renal cyst measuring 19 mm in greatest diameter.   LEFT KIDNEY: The left kidney measures 12.8 cm in length. The renal cortical echogenicity and thickness are within normal limits. No hydronephrosis is present; no evidence of nephrolithiasis. Simple and minimally complex left renal cysts with the largest appearing as partially septated and minimally complex measuring approximately 30 x 24 x 25 mm.   BLADDER: The bladder is grossly unremarkable.       Simple and minimally complex likely benign renal cysts bilaterally.   No hydronephrosis.   MACRO: None   Signed by: Flako Cook 7/6/2024 8:34 PM Dictation workstation:   [id number]      EKG:   No results found for: \"EKG\"      Radiology:     No orders to display   Cardiac Cath Post Procedure Notes:  Post Procedure Diagnosis: Double vessel disease.  Blood Loss:               Estimated blood loss during the procedure was 0 mls.  Specimens Removed:        Number of specimen(s) removed: none.     ____________________________________________________________________________________  CONCLUSIONS:   1. Left Main Coronary Artery: This artery is normal.   2. Left Anterior Descending Artery: presents luminal irregularities and contains patent previously placed stents.   3. Proximal LAD Lesion: The percent stenosis is 50%.   4. " Circumflex Coronary Artery: presents luminal irregularities.   5. Mid CX Lesion: The percent stenosis is 50%.   6. Right Coronary Artery: presents luminal irregularities.   7. The Left Ventricular Ejection Fraction is 55%.     ICD 10 Codes:  Angina pectoris, unspecified-I20.9     CPT Codes:  Left Heart Cath (visualization of coronaries) and LV-58195; FFR, initial Vessel (PCI)-43154; Moderate Sedation Services 1st additional 15 minutes patient >5 years-24665; Moderate Sedation Services 2nd additional 15 minutes patient >5 years-18565     [id number] Carlito Adrian MD  Performing Physician  Electronically signed by [id number] Carlito Adrian MD on 2/29/2024 at 2:36:29 PM    Assessment/Plan:     Problem List Items Addressed This Visit       Dyslipidemia    CAD (coronary artery disease)    MAYELA on CPAP    PVC (premature ventricular contraction)    Type 2 diabetes mellitus    Stented coronary artery    RESOLVED: Class 3 severe obesity due to excess calories with serious comorbidity and body mass index (BMI) of 40.0 to 44.9 in adult    Former smoker    Non-ischemic myocardial injury (non-traumatic)    Presence of stent in anterior descending branch of left coronary artery       Patient Active Problem List   Diagnosis    Amaurosis fugax of right eye    Anxiety    BPPV (benign paroxysmal positional vertigo)    Dyslipidemia    High serum vitamin E    Hypertension    Hypertrophy of nasal turbinates    CAD (coronary artery disease)    Claudication (CMS-HCC)    Fatigue    Flexor tendinitis of hand    Nasal septal deviation    Oligomenorrhea    MAYELA on CPAP    Palpitations    PVC (premature ventricular contraction)    Vertigo    Type 2 diabetes mellitus    Transient visual loss of right eye    Stented coronary artery    SOBOE (shortness of breath on exertion)    Pain of right upper extremity    Basal cell carcinoma    Flexor carpi ulnaris tendinitis    [place] nodes    Hyperlipidemia    Hypertriglyceridemia    OA (osteoarthritis)    PCOS  (polycystic ovarian syndrome)    Renal cyst    Tinea corporis    Vitamin deficiency    Elevated androgen levels    Sleep apnea    Dyspnea    Controlled type 2 diabetes mellitus without complication, without long-term current use of insulin (Multi)    Anticoagulant long-term use    Former smoker    Non-ischemic myocardial injury (non-traumatic)    Presence of stent in anterior descending branch of left coronary artery    Abnormal stress test    Intradermal nevus    Seborrheic keratoses         ASSESSMENT       Is a 54-year-old female seen evaluate today in routine cardiac follow-up.    Meds, vitals, examination as noted.    Chart review details cussed the patient at length.    Impression:  ASHD class II  Remote PCI and stenting of the LAD  Type 2 diabetes  Morbid obesity  Chronic dyspnea  Mild residual coronary disease by her most recent catheterization February 2024  Sleep apnea        Recommendation:  Continue current meds  Exercise and weight loss  See me in 6 months  Call should any issues or problems otherwise develop

## 2024-10-16 ENCOUNTER — HOSPITAL ENCOUNTER (OUTPATIENT)
Dept: RADIOLOGY | Facility: CLINIC | Age: 54
Discharge: HOME | End: 2024-10-16
Payer: COMMERCIAL

## 2024-10-16 ENCOUNTER — OFFICE VISIT (OUTPATIENT)
Dept: PRIMARY CARE | Facility: CLINIC | Age: 54
End: 2024-10-16
Payer: COMMERCIAL

## 2024-10-16 VITALS
WEIGHT: 236 LBS | RESPIRATION RATE: 16 BRPM | BODY MASS INDEX: 44.56 KG/M2 | DIASTOLIC BLOOD PRESSURE: 82 MMHG | SYSTOLIC BLOOD PRESSURE: 120 MMHG | TEMPERATURE: 98 F | OXYGEN SATURATION: 98 % | HEART RATE: 79 BPM | HEIGHT: 61 IN

## 2024-10-16 DIAGNOSIS — M54.50 ACUTE LOW BACK PAIN WITHOUT SCIATICA, UNSPECIFIED BACK PAIN LATERALITY: Primary | ICD-10-CM

## 2024-10-16 DIAGNOSIS — M54.50 ACUTE LOW BACK PAIN WITHOUT SCIATICA, UNSPECIFIED BACK PAIN LATERALITY: ICD-10-CM

## 2024-10-16 PROCEDURE — 72100 X-RAY EXAM L-S SPINE 2/3 VWS: CPT

## 2024-10-16 PROCEDURE — 99213 OFFICE O/P EST LOW 20 MIN: CPT | Performed by: NURSE PRACTITIONER

## 2024-10-16 PROCEDURE — 72100 X-RAY EXAM L-S SPINE 2/3 VWS: CPT | Performed by: RADIOLOGY

## 2024-10-16 ASSESSMENT — ENCOUNTER SYMPTOMS
SHORTNESS OF BREATH: 0
BACK PAIN: 1
ABDOMINAL PAIN: 0
COUGH: 0
FEVER: 0
HEMATURIA: 0
WEAKNESS: 0
NUMBNESS: 0
CHILLS: 0
DYSURIA: 0
PALPITATIONS: 0

## 2024-10-16 NOTE — PROGRESS NOTES
"Subjective   Patient ID: Turner Thakkar is a 54 y.o. female who presents for Back Pain.    Patient is following up from an emergency room visit for her back pain. She states she went to the ER earlier today with lower back pain that has been going on since yesterday.     Back Pain  Pertinent negatives include no abdominal pain, chest pain, dysuria, fever, numbness or weakness.      54-year-old female presents today complaining of low back pain.  Patient states that she had a screening colonoscopy done 2 days ago.  Yesterday upon awakening, she experienced severe low back pain.  She did go to the emergency department this morning and was prescribed prednisone and Flexeril.  She denies any radiation of the pain.  No numbness, tingling or weakness.  She denies any loss of bowel or bladder function or fever/chills.  No imaging was done in the ER.      Review of Systems   Constitutional:  Negative for chills and fever.   Respiratory:  Negative for cough and shortness of breath.    Cardiovascular:  Negative for chest pain and palpitations.   Gastrointestinal:  Negative for abdominal pain.   Genitourinary:  Negative for dysuria and hematuria.   Musculoskeletal:  Positive for back pain.   Neurological:  Negative for weakness and numbness.       Objective   /82 (BP Location: Right arm, Patient Position: Sitting, BP Cuff Size: Adult)   Pulse 79   Temp 36.7 °C (98 °F) (Temporal)   Resp 16   Ht 1.549 m (5' 1\")   Wt 107 kg (236 lb)   SpO2 98%   BMI 44.59 kg/m²     Physical Exam  Constitutional:       General: She is not in acute distress.     Appearance: Normal appearance. She is obese.   Cardiovascular:      Rate and Rhythm: Normal rate and regular rhythm.      Heart sounds: Normal heart sounds.   Pulmonary:      Effort: Pulmonary effort is normal.      Breath sounds: Normal breath sounds.   Abdominal:      General: Bowel sounds are normal.      Palpations: Abdomen is soft.      Tenderness: There is no abdominal " tenderness.   Musculoskeletal:      Right lower leg: No edema.      Left lower leg: No edema.      Comments: Lumbar Spine: Tenderness to palpation over L2-L5  Positive bilateral paraspinous muscle tenderness  ROM limited on flexion, extension, left/right lateral bend and left/right rotation.   Lower extremity strength wnl.   Straight leg raise POSITIVE   Skin:     General: Skin is warm and dry.   Neurological:      Mental Status: She is alert.      Motor: No weakness.      Gait: Gait normal.      Deep Tendon Reflexes: Reflexes normal.   Psychiatric:         Mood and Affect: Mood normal.         Behavior: Behavior normal.         Assessment/Plan   Problem List Items Addressed This Visit    None  Visit Diagnoses         Codes    Acute low back pain without sciatica, unspecified back pain laterality    -  Primary M54.50    Relevant Orders    XR lumbar spine 2-3 views    Referral to Neurosurgery          She was referred to a Clinic spine specialist, but is requesting referral for  spine specialist.   Encouraged filling and taking medications as prescribed by ER.   Lumbar spine xray ordered today.   Follow up as needed with any persisting symptoms.   If any severe/worsening symptoms, to ER.

## 2024-10-28 DIAGNOSIS — E78.5 DYSLIPIDEMIA: ICD-10-CM

## 2024-10-28 DIAGNOSIS — I49.3 PVC (PREMATURE VENTRICULAR CONTRACTION): ICD-10-CM

## 2024-10-28 RX ORDER — EZETIMIBE 10 MG/1
10 TABLET ORAL DAILY
Qty: 90 TABLET | Refills: 3 | Status: SHIPPED | OUTPATIENT
Start: 2024-10-28

## 2024-10-28 RX ORDER — DOFETILIDE 0.12 MG/1
CAPSULE ORAL EVERY 12 HOURS
Qty: 180 CAPSULE | Refills: 3 | Status: SHIPPED | OUTPATIENT
Start: 2024-10-28

## 2024-10-30 ENCOUNTER — APPOINTMENT (OUTPATIENT)
Dept: NEUROSURGERY | Facility: CLINIC | Age: 54
End: 2024-10-30

## 2024-11-15 ENCOUNTER — LAB (OUTPATIENT)
Dept: LAB | Facility: LAB | Age: 54
End: 2024-11-15
Payer: COMMERCIAL

## 2024-11-15 ENCOUNTER — HOSPITAL ENCOUNTER (OUTPATIENT)
Dept: RADIOLOGY | Facility: CLINIC | Age: 54
Discharge: HOME | End: 2024-11-15
Payer: COMMERCIAL

## 2024-11-15 DIAGNOSIS — Z78.0 POSTMENOPAUSAL: ICD-10-CM

## 2024-11-15 DIAGNOSIS — E55.9 VITAMIN D DEFICIENCY, UNSPECIFIED: Primary | ICD-10-CM

## 2024-11-15 DIAGNOSIS — Z13.220 ENCOUNTER FOR SCREENING FOR LIPOID DISORDERS: ICD-10-CM

## 2024-11-15 DIAGNOSIS — Z13.29 ENCOUNTER FOR SCREENING FOR OTHER SUSPECTED ENDOCRINE DISORDER: ICD-10-CM

## 2024-11-15 DIAGNOSIS — E11.9 TYPE 2 DIABETES MELLITUS WITHOUT COMPLICATIONS (MULTI): ICD-10-CM

## 2024-11-15 DIAGNOSIS — I25.10 ATHEROSCLEROTIC HEART DISEASE OF NATIVE CORONARY ARTERY WITHOUT ANGINA PECTORIS: ICD-10-CM

## 2024-11-15 DIAGNOSIS — I10 ESSENTIAL (PRIMARY) HYPERTENSION: ICD-10-CM

## 2024-11-15 LAB
25(OH)D3 SERPL-MCNC: 62 NG/ML (ref 30–100)
ALBUMIN SERPL BCP-MCNC: 4.5 G/DL (ref 3.4–5)
ALP SERPL-CCNC: 65 U/L (ref 33–110)
ALT SERPL W P-5'-P-CCNC: 33 U/L (ref 7–45)
ANION GAP SERPL CALC-SCNC: 12 MMOL/L (ref 10–20)
APPEARANCE UR: ABNORMAL
AST SERPL W P-5'-P-CCNC: 30 U/L (ref 9–39)
BACTERIA #/AREA URNS AUTO: ABNORMAL /HPF
BASOPHILS # BLD AUTO: 0.04 X10*3/UL (ref 0–0.1)
BASOPHILS NFR BLD AUTO: 0.5 %
BILIRUB SERPL-MCNC: 0.8 MG/DL (ref 0–1.2)
BILIRUB UR STRIP.AUTO-MCNC: NEGATIVE MG/DL
BUN SERPL-MCNC: 13 MG/DL (ref 6–23)
CALCIUM SERPL-MCNC: 9.6 MG/DL (ref 8.6–10.3)
CHLORIDE SERPL-SCNC: 102 MMOL/L (ref 98–107)
CHOLEST SERPL-MCNC: 241 MG/DL (ref 0–199)
CHOLESTEROL/HDL RATIO: 6.2
CO2 SERPL-SCNC: 29 MMOL/L (ref 21–32)
COLOR UR: YELLOW
CREAT SERPL-MCNC: 0.73 MG/DL (ref 0.5–1.05)
CREAT UR-MCNC: 242.6 MG/DL (ref 20–320)
EGFRCR SERPLBLD CKD-EPI 2021: >90 ML/MIN/1.73M*2
EOSINOPHIL # BLD AUTO: 0.21 X10*3/UL (ref 0–0.7)
EOSINOPHIL NFR BLD AUTO: 2.7 %
ERYTHROCYTE [DISTWIDTH] IN BLOOD BY AUTOMATED COUNT: 12.2 % (ref 11.5–14.5)
EST. AVERAGE GLUCOSE BLD GHB EST-MCNC: 223 MG/DL
GLUCOSE SERPL-MCNC: 145 MG/DL (ref 74–99)
GLUCOSE UR STRIP.AUTO-MCNC: ABNORMAL MG/DL
HBA1C MFR BLD: 9.4 %
HCT VFR BLD AUTO: 46.6 % (ref 36–46)
HDLC SERPL-MCNC: 39 MG/DL
HGB BLD-MCNC: 16 G/DL (ref 12–16)
HYALINE CASTS #/AREA URNS AUTO: ABNORMAL /LPF
IMM GRANULOCYTES # BLD AUTO: 0.04 X10*3/UL (ref 0–0.7)
IMM GRANULOCYTES NFR BLD AUTO: 0.5 % (ref 0–0.9)
KETONES UR STRIP.AUTO-MCNC: NEGATIVE MG/DL
LDLC SERPL CALC-MCNC: 156 MG/DL
LEUKOCYTE ESTERASE UR QL STRIP.AUTO: NEGATIVE
LYMPHOCYTES # BLD AUTO: 2.02 X10*3/UL (ref 1.2–4.8)
LYMPHOCYTES NFR BLD AUTO: 26.1 %
MCH RBC QN AUTO: 30 PG (ref 26–34)
MCHC RBC AUTO-ENTMCNC: 34.3 G/DL (ref 32–36)
MCV RBC AUTO: 87 FL (ref 80–100)
MICROALBUMIN UR-MCNC: 154 MG/L
MICROALBUMIN/CREAT UR: 63.5 UG/MG CREAT
MONOCYTES # BLD AUTO: 0.73 X10*3/UL (ref 0.1–1)
MONOCYTES NFR BLD AUTO: 9.4 %
MUCOUS THREADS #/AREA URNS AUTO: ABNORMAL /LPF
NEUTROPHILS # BLD AUTO: 4.71 X10*3/UL (ref 1.2–7.7)
NEUTROPHILS NFR BLD AUTO: 60.8 %
NITRITE UR QL STRIP.AUTO: NEGATIVE
NON HDL CHOLESTEROL: 202 MG/DL (ref 0–149)
NRBC BLD-RTO: 0 /100 WBCS (ref 0–0)
PH UR STRIP.AUTO: 6 [PH]
PLATELET # BLD AUTO: 304 X10*3/UL (ref 150–450)
POTASSIUM SERPL-SCNC: 4 MMOL/L (ref 3.5–5.3)
PROT SERPL-MCNC: 7.3 G/DL (ref 6.4–8.2)
PROT UR STRIP.AUTO-MCNC: ABNORMAL MG/DL
RBC # BLD AUTO: 5.33 X10*6/UL (ref 4–5.2)
RBC # UR STRIP.AUTO: NEGATIVE /UL
RBC #/AREA URNS AUTO: ABNORMAL /HPF
SODIUM SERPL-SCNC: 139 MMOL/L (ref 136–145)
SP GR UR STRIP.AUTO: 1.03
SQUAMOUS #/AREA URNS AUTO: ABNORMAL /HPF
TRIGL SERPL-MCNC: 228 MG/DL (ref 0–149)
TSH SERPL-ACNC: 0.95 MIU/L (ref 0.44–3.98)
UROBILINOGEN UR STRIP.AUTO-MCNC: NORMAL MG/DL
VIT B12 SERPL-MCNC: 256 PG/ML (ref 211–911)
VLDL: 46 MG/DL (ref 0–40)
WBC # BLD AUTO: 7.8 X10*3/UL (ref 4.4–11.3)
WBC #/AREA URNS AUTO: ABNORMAL /HPF

## 2024-11-15 PROCEDURE — 82043 UR ALBUMIN QUANTITATIVE: CPT

## 2024-11-15 PROCEDURE — 82570 ASSAY OF URINE CREATININE: CPT

## 2024-11-15 PROCEDURE — 80061 LIPID PANEL: CPT

## 2024-11-15 PROCEDURE — 77080 DXA BONE DENSITY AXIAL: CPT | Performed by: RADIOLOGY

## 2024-11-15 PROCEDURE — 83036 HEMOGLOBIN GLYCOSYLATED A1C: CPT

## 2024-11-15 PROCEDURE — 80053 COMPREHEN METABOLIC PANEL: CPT

## 2024-11-15 PROCEDURE — 77080 DXA BONE DENSITY AXIAL: CPT

## 2024-11-15 PROCEDURE — 84443 ASSAY THYROID STIM HORMONE: CPT

## 2024-11-15 PROCEDURE — 82607 VITAMIN B-12: CPT

## 2024-11-15 PROCEDURE — 36415 COLL VENOUS BLD VENIPUNCTURE: CPT

## 2024-11-15 PROCEDURE — 81001 URINALYSIS AUTO W/SCOPE: CPT

## 2024-11-15 PROCEDURE — 82306 VITAMIN D 25 HYDROXY: CPT

## 2024-11-15 PROCEDURE — 85025 COMPLETE CBC W/AUTO DIFF WBC: CPT

## 2025-01-12 DIAGNOSIS — Z79.01 ANTICOAGULANT LONG-TERM USE: ICD-10-CM

## 2025-01-13 DIAGNOSIS — I10 PRIMARY HYPERTENSION: ICD-10-CM

## 2025-01-13 RX ORDER — AMLODIPINE BESYLATE 10 MG/1
TABLET ORAL
Qty: 90 TABLET | Refills: 3 | Status: SHIPPED | OUTPATIENT
Start: 2025-01-13

## 2025-01-13 NOTE — TELEPHONE ENCOUNTER
Received request for prescription refills for patient.   Patient follows with Dr. Urrutia    Request is for amLODIPine   Is patient currently on medication yes    Last OV 2/27/2024  Next OV 4/14/2025    Pended for signing and sent to provider

## 2025-01-14 RX ORDER — APIXABAN 5 MG/1
5 TABLET, FILM COATED ORAL 2 TIMES DAILY
Qty: 60 TABLET | Refills: 3 | Status: SHIPPED | OUTPATIENT
Start: 2025-01-14

## 2025-01-22 ENCOUNTER — APPOINTMENT (OUTPATIENT)
Dept: CARDIOLOGY | Facility: CLINIC | Age: 55
End: 2025-01-22
Payer: COMMERCIAL

## 2025-01-30 ENCOUNTER — TELEPHONE (OUTPATIENT)
Dept: CARDIOLOGY | Facility: CLINIC | Age: 55
End: 2025-01-30
Payer: COMMERCIAL

## 2025-01-30 NOTE — TELEPHONE ENCOUNTER
Called and spoke to patient about changing the appointment to be with Nadira. Patient said it was okay to move the appointment to be with Nadira on the same day at 3pm.

## 2025-02-14 ENCOUNTER — OFFICE VISIT (OUTPATIENT)
Dept: OBSTETRICS AND GYNECOLOGY | Facility: CLINIC | Age: 55
End: 2025-02-14
Payer: COMMERCIAL

## 2025-02-14 VITALS — BODY MASS INDEX: 45.54 KG/M2 | WEIGHT: 241 LBS | SYSTOLIC BLOOD PRESSURE: 124 MMHG | DIASTOLIC BLOOD PRESSURE: 70 MMHG

## 2025-02-14 DIAGNOSIS — R87.618 PAP SMEAR ABNORMALITY OF CERVIX/HUMAN PAPILLOMAVIRUS (HPV) POSITIVE: Primary | ICD-10-CM

## 2025-02-14 PROCEDURE — 99204 OFFICE O/P NEW MOD 45 MIN: CPT | Performed by: OBSTETRICS & GYNECOLOGY

## 2025-02-14 PROCEDURE — 4010F ACE/ARB THERAPY RXD/TAKEN: CPT | Performed by: OBSTETRICS & GYNECOLOGY

## 2025-02-14 PROCEDURE — 3078F DIAST BP <80 MM HG: CPT | Performed by: OBSTETRICS & GYNECOLOGY

## 2025-02-14 PROCEDURE — 3074F SYST BP LT 130 MM HG: CPT | Performed by: OBSTETRICS & GYNECOLOGY

## 2025-02-14 NOTE — PROGRESS NOTES
GYN PROGRESS NOTE          Chief complaint: new pt    HPI:  Patient answers are not available for this visit.  HPI       Consult     Additional comments: New pt             Comments    Had pap 12/2024 hpv other positive- 2022 hpv other positive-11/2021 hpv other positive-7/2020 hpv other +  Had heavy bleeding with lots of clots had endometrial ablation around 2009 would spot 1 day a month for a year then the bleeding came back every month for 5 days  Had colposcopy done would like hysterectomy feels like symptoms will get worse   EMB 7/2023 for pmb- emb 10/2022 -emb 3/2022  Dexa 2024- liam 3/2024- pelvic us 12/2022  Recently stopped norethindrone            Last edited by Fauzia Gayle MA on 2/14/2025 10:24 AM.          Reviewed case with patient, reviewed plans.      ROS:  GEN - no fevers or chills  RESP - no SOB or cough  GYN - see HPI      HISTORY:  Past Medical History:   Diagnosis Date    Acute nasopharyngitis (common cold) 10/11/2019    Nasopharyngitis    Acute nasopharyngitis (common cold) 10/11/2019    Nasopharyngitis    ASHD (arteriosclerotic heart disease)     Body mass index (BMI) 45.0-49.9, adult (Multi) 11/09/2021    Body mass index (BMI) of 45.0-49.9 in adult    Chronic ethmoidal sinusitis 09/08/2020    Chronic ethmoidal sinusitis    Chronic ethmoidal sinusitis 10/20/2020    Chronic ethmoidal sinusitis    Chronic rhinitis     Rhinitis    Coronary artery disease     COVID-19 01/14/2022    COVID    Diabetes mellitus (Multi)     Essential (primary) hypertension     Hypertension, benign    Hyperlipidemia     Hypertrophy of nasal turbinates 10/20/2020    Hypertrophy, nasal, turbinate    Morbid (severe) obesity due to excess calories (Multi) 01/14/2022    Morbid obesity with BMI of 40.0-44.9, adult    Morbid (severe) obesity due to excess calories (Multi)     Class 3 severe obesity due to excess calories with serious comorbidity and body mass index (BMI) of 45.0 to 49.9 in adult    Nasal congestion 08/08/2020     Nasal congestion with rhinorrhea    MAYELA on CPAP     Other abnormal glucose 10/07/2021    Elevated glucose    Other specified disorders of nose and nasal sinuses 09/08/2020    Nasal hypertrophy    Personal history of nicotine dependence 02/10/2020    History of tobacco abuse    Personal history of other diseases of the circulatory system 01/19/2022    History of atrial fibrillation    Personal history of other diseases of the nervous system and sense organs 11/12/2020    History of obstructive sleep apnea    Personal history of other diseases of the respiratory system 08/06/2020    History of allergic rhinitis    Personal history of other diseases of the respiratory system 09/08/2020    History of deviated nasal septum    Personal history of other diseases of the respiratory system 01/27/2020    History of upper respiratory infection    Personal history of other diseases of the respiratory system 03/18/2020    History of acute pharyngitis    Personal history of other diseases of the respiratory system 04/01/2020    History of acute bronchitis    Personal history of other endocrine, nutritional and metabolic disease 12/28/2021    History of morbid obesity    Personal history of other infectious and parasitic diseases 08/10/2021    History of tinea corporis    Personal history of other infectious and parasitic diseases 04/01/2020    History of candidiasis of mouth    Personal history of other infectious and parasitic diseases 04/01/2020    History of candidiasis of vagina    Personal history of other infectious and parasitic diseases 10/15/2019    History of candidiasis of vagina    Personal history of other specified conditions 12/28/2021    History of palpitations    Personal history of other specified conditions 08/06/2020    History of nasal congestion    Personal history of other specified conditions 09/08/2020    History of postnasal drip    Personal history of other specified conditions 02/18/2022    History  of fatigue    Personal history of other specified conditions 2020    History of postnasal drip    Personal history of other specified conditions 2020    History of aphasia    Personal history of other specified conditions 2020    History of wheezing    Personal history of pneumonia (recurrent) 2021    History of pneumonia    Polyp of colon     Colorectal polyp detected on colonoscopy    Rash and other nonspecific skin eruption 08/10/2021    Rash    Slurred speech     Deficit in communication due to slurred speech    Spontaneous ecchymoses 2021    Petechiae    Wheezing 2020    Expiratory wheezing     Past Surgical History:   Procedure Laterality Date    CARDIAC CATHETERIZATION N/A 2024    Procedure: Left Heart Cath Possible STAT;  Surgeon: Carlito Adrian MD;  Location: [facility];  Service: Cardiovascular;  Laterality: N/A;    CARDIAC CATHETERIZATION N/A 2024    Procedure: IFR (Instant Wave Free Ration);  Surgeon: Carlito Adrian MD;  Location: [facility];  Service: Cardiovascular;  Laterality: N/A;    COLONOSCOPY W/ POLYPECTOMY  10/14/2024    CORONARY ANGIOPLASTY WITH STENT PLACEMENT Left     MR HEAD ANGIO WO IV CONTRAST  2020    MR HEAD ANGIO WO IV CONTRAST 2020 [facility]    MR NECK ANGIO WO IV CONTRAST  2020    MR NECK ANGIO WO IV CONTRAST 2020 [facility]    OTHER SURGICAL HISTORY  2021    Carpal tunnel surgery    OTHER SURGICAL HISTORY  2021    Cyst excision    OTHER SURGICAL HISTORY  2021    Uterine nerve ablation    OTHER SURGICAL HISTORY  2021     section    OTHER SURGICAL HISTORY  2021    Tubal ligation    OTHER SURGICAL HISTORY  2021    Tonsillectomy    OTHER SURGICAL HISTORY  10/15/2019    Colonic polypectomy     Social History     Socioeconomic History    Marital status:      Spouse name: Not on file    Number of children: Not on file    Years of  education: Not on file    Highest education level: Not on file   Occupational History    Not on file   Tobacco Use    Smoking status: Former     Current packs/day: 0.00     Average packs/day: 1.5 packs/day for 30.0 years (45.0 ttl pk-yrs)     Types: Cigarettes     Start date: 1990     Quit date: 2020     Years since quittin.0    Smokeless tobacco: Never   Vaping Use    Vaping status: Never Used   Substance and Sexual Activity    Alcohol use: Never    Drug use: Never    Sexual activity: Defer   Other Topics Concern    Not on file   Social History Narrative    Not on file     Social Drivers of Health     Financial Resource Strain: Not on file   Food Insecurity: Not on file   Transportation Needs: Not on file   Physical Activity: Not on file   Stress: Not on file   Social Connections: Not on file   Intimate Partner Violence: Not on file   Housing Stability: Not on file     Cancer-related family history is not on file.       PHYSICAL EXAM:  /70   Wt 109 kg (241 lb)   LMP 2020   BMI 45.54 kg/m²   GEN:  A&O, NAD  HEENT:  head HC/AT, no visible goiter  PSYCH:  normal affect, non-anxious      IMPRESSION/PLAN:            Steve Bill MD

## 2025-02-20 ENCOUNTER — OFFICE VISIT (OUTPATIENT)
Dept: CARDIOLOGY | Facility: CLINIC | Age: 55
End: 2025-02-20
Payer: COMMERCIAL

## 2025-02-20 ENCOUNTER — APPOINTMENT (OUTPATIENT)
Dept: CARDIOLOGY | Facility: CLINIC | Age: 55
End: 2025-02-20
Payer: COMMERCIAL

## 2025-02-20 VITALS
HEIGHT: 61 IN | DIASTOLIC BLOOD PRESSURE: 70 MMHG | BODY MASS INDEX: 45.12 KG/M2 | HEART RATE: 75 BPM | SYSTOLIC BLOOD PRESSURE: 130 MMHG | WEIGHT: 239 LBS

## 2025-02-20 DIAGNOSIS — G47.33 OSA ON CPAP: ICD-10-CM

## 2025-02-20 DIAGNOSIS — I48.0 PAROXYSMAL ATRIAL FIBRILLATION (MULTI): Primary | ICD-10-CM

## 2025-02-20 PROCEDURE — 93000 ELECTROCARDIOGRAM COMPLETE: CPT | Performed by: INTERNAL MEDICINE

## 2025-02-20 RX ORDER — METFORMIN HYDROCHLORIDE 1000 MG/1
TABLET ORAL
COMMUNITY
Start: 2025-01-06

## 2025-02-20 RX ORDER — LIDOCAINE 4 G/100G
PATCH TOPICAL
COMMUNITY
Start: 2024-10-16

## 2025-02-20 RX ORDER — KETOCONAZOLE 20 MG/G
CREAM TOPICAL
COMMUNITY
Start: 2024-12-30

## 2025-02-20 NOTE — PATIENT INSTRUCTIONS
Great to see you today.   Please take your medications and or due life style behavior modifications as discussed.   Please make appointment in 6 months with Dr Miranda.   Please call if you have any questions or concerns.  Please go to emergency department if you have abrupt onset of chest, shortness of breath, light headedness or dizziness.

## 2025-02-20 NOTE — ASSESSMENT & PLAN NOTE
Today she is in normal sinus rhythm with a PVC, rate 75 bpm, QTc 506 ms.  Review of her prior QTc intervals have been 516 - 506 ms last year.  We discussed that her interval has been stable but we will keep tabs on it.

## 2025-02-20 NOTE — PROGRESS NOTES
Electrophysiology Office Visit       CHIEF COMPLAINT  Chief Complaint   Patient presents with    Follow-up     Pt is here today following up after 6 months         Primary EP doctor: Dr Miranda  Primary Cardiologist: Dr Urrutia    EP History: Persistent AFIB since 2021 during COVID hospitalization  - 12/5/2021 pAFIB w/ RVR during hospitalization for covid PNA, started on cardizem, converted to NSR, dc'd on BB  - 12/12/21-12/15/21 Repeat admission for Covid PNA, hypoxia, and AFIB w/ RVR, d/c on metoprolol, flecainide, Eliquis, lisinopril  - 1/31/23-2/3/23 Admitted 2/2 CP, had LAD stenting. Persistent AFIB w/ prolonged QTc required d/c of flecainide (along with her CAD) and started Tikosyn 125 mcg BID.  Tikosyn 250 mg BID produced significant QTc prolongation  Controlled on dofetilide 125 mcg BID, metoprolol succinate 100 mg QD, lisinopril 40 mg QD, amlodipine 10 mg QD, MgOx 800mg every day, Eliquis 5 mg twice daily      HPI: 2/20/2025  Pt denies Denies cp, sob, light headedness or dizziness. She says she's doing well.  Has severe MAYELA compliant with CPAP.   Her A1c has worsened and we spoke about changing her Rybelsus to Mounjaro as it's also indicated for MAYELA.      Today's EKG Interpretation: NSR, rate 75bpm, QTC 506ms, PVC  - To note, her QTCs have been 516ms (1/10/24), 514ms (2/29/24), 506ms (7/12/24)    2/29/24 Heart cath due to worsening angina, h/o LAD stenting 1/31/23  CONCLUSIONS:   1. Left Main Coronary Artery: This artery is normal.   2. Left Anterior Descending Artery: presents luminal irregularities and contains patent previously placed stents.   3. Proximal LAD Lesion: The percent stenosis is 50%.   4. Circumflex Coronary Artery: presents luminal irregularities.   5. Mid CX Lesion: The percent stenosis is 50%.   6. Right Coronary Artery: presents luminal irregularities.   7. The Left Ventricular Ejection Fraction is 55%.    VITALS  Vitals:    02/20/25 1539   BP: 130/70   Pulse: 75     Wt Readings from Last 4  Encounters:   02/20/25 108 kg (239 lb)   02/14/25 109 kg (241 lb)   10/16/24 107 kg (236 lb)   10/15/24 107 kg (235 lb 8 oz)       PHYSICAL EXAM:  GENERAL:  Well developed, well nourished, in no acute distress.  NEURO/PSYCH:  No focal deficits. A&O x 3   LUNGS:  Clear to auscultation bilaterally. No wheezing, rhonci, or crackles  HEART:  RRR, no M/R/G.   EXTREMITIES:  Warm with good color, no clubbing or cyanosis.  No pitting edema.     Past Medical History:   Diagnosis Date    Acute nasopharyngitis (common cold) 10/11/2019    Nasopharyngitis    Acute nasopharyngitis (common cold) 10/11/2019    Nasopharyngitis    ASHD (arteriosclerotic heart disease)     Body mass index (BMI) 45.0-49.9, adult (Multi) 11/09/2021    Body mass index (BMI) of 45.0-49.9 in adult    Chronic ethmoidal sinusitis 09/08/2020    Chronic ethmoidal sinusitis    Chronic ethmoidal sinusitis 10/20/2020    Chronic ethmoidal sinusitis    Chronic rhinitis     Rhinitis    Coronary artery disease     COVID-19 01/14/2022    COVID    Diabetes mellitus (Multi)     Essential (primary) hypertension     Hypertension, benign    Hyperlipidemia     Hypertrophy of nasal turbinates 10/20/2020    Hypertrophy, nasal, turbinate    Morbid (severe) obesity due to excess calories (Multi) 01/14/2022    Morbid obesity with BMI of 40.0-44.9, adult    Morbid (severe) obesity due to excess calories (Multi)     Class 3 severe obesity due to excess calories with serious comorbidity and body mass index (BMI) of 45.0 to 49.9 in adult    Nasal congestion 08/08/2020    Nasal congestion with rhinorrhea    MAYELA on CPAP     Other abnormal glucose 10/07/2021    Elevated glucose    Other specified disorders of nose and nasal sinuses 09/08/2020    Nasal hypertrophy    Personal history of nicotine dependence 02/10/2020    History of tobacco abuse    Personal history of other diseases of the circulatory system 01/19/2022    History of atrial fibrillation    Personal history of other  diseases of the nervous system and sense organs 11/12/2020    History of obstructive sleep apnea    Personal history of other diseases of the respiratory system 08/06/2020    History of allergic rhinitis    Personal history of other diseases of the respiratory system 09/08/2020    History of deviated nasal septum    Personal history of other diseases of the respiratory system 01/27/2020    History of upper respiratory infection    Personal history of other diseases of the respiratory system 03/18/2020    History of acute pharyngitis    Personal history of other diseases of the respiratory system 04/01/2020    History of acute bronchitis    Personal history of other endocrine, nutritional and metabolic disease 12/28/2021    History of morbid obesity    Personal history of other infectious and parasitic diseases 08/10/2021    History of tinea corporis    Personal history of other infectious and parasitic diseases 04/01/2020    History of candidiasis of mouth    Personal history of other infectious and parasitic diseases 04/01/2020    History of candidiasis of vagina    Personal history of other infectious and parasitic diseases 10/15/2019    History of candidiasis of vagina    Personal history of other specified conditions 12/28/2021    History of palpitations    Personal history of other specified conditions 08/06/2020    History of nasal congestion    Personal history of other specified conditions 09/08/2020    History of postnasal drip    Personal history of other specified conditions 02/18/2022    History of fatigue    Personal history of other specified conditions 01/25/2020    History of postnasal drip    Personal history of other specified conditions 06/23/2020    History of aphasia    Personal history of other specified conditions 01/31/2020    History of wheezing    Personal history of pneumonia (recurrent) 12/22/2021    History of pneumonia    Polyp of colon     Colorectal polyp detected on colonoscopy     Rash and other nonspecific skin eruption 08/10/2021    Rash    Slurred speech     Deficit in communication due to slurred speech    Spontaneous ecchymoses 2021    Petechiae    Wheezing 2020    Expiratory wheezing       Past Surgical History:   Procedure Laterality Date    CARDIAC CATHETERIZATION N/A 2024    Procedure: Left Heart Cath Possible STAT;  Surgeon: Carlito Adrian MD;  Location: [facility];  Service: Cardiovascular;  Laterality: N/A;    CARDIAC CATHETERIZATION N/A 2024    Procedure: IFR (Instant Wave Free Ration);  Surgeon: Carlito Adrian MD;  Location: [facility];  Service: Cardiovascular;  Laterality: N/A;    COLONOSCOPY W/ POLYPECTOMY  10/14/2024    CORONARY ANGIOPLASTY WITH STENT PLACEMENT Left     MR HEAD ANGIO WO IV CONTRAST  2020    MR HEAD ANGIO WO IV CONTRAST 2020 [facility]    MR NECK ANGIO WO IV CONTRAST  2020    MR NECK ANGIO WO IV CONTRAST 2020 [facility]    OTHER SURGICAL HISTORY  2021    Carpal tunnel surgery    OTHER SURGICAL HISTORY  2021    Cyst excision    OTHER SURGICAL HISTORY  2021    Uterine nerve ablation    OTHER SURGICAL HISTORY  2021     section    OTHER SURGICAL HISTORY  2021    Tubal ligation    OTHER SURGICAL HISTORY  2021    Tonsillectomy    OTHER SURGICAL HISTORY  10/15/2019    Colonic polypectomy       Social History     Tobacco Use    Smoking status: Former     Current packs/day: 0.00     Average packs/day: 1.5 packs/day for 30.0 years (45.0 ttl pk-yrs)     Types: Cigarettes     Start date: 1990     Quit date: 2020     Years since quittin.0    Smokeless tobacco: Never   Vaping Use    Vaping status: Never Used   Substance Use Topics    Alcohol use: Never    Drug use: Never       Family History   Problem Relation Name Age of Onset    Diabetes Mother      Hypertension Mother      Other (presence of stent in coronary artery) Mother       Other (presence of stent in coronary artery) Father      Other (CABG) Father      Diabetes Father         Allergies   Allergen Reactions    Oxycodone-Acetaminophen Palpitations     palpitations        Current Outpatient Medications   Medication Instructions    albuterol (Proventil HFA) 90 mcg/actuation inhaler 2 puffs, inhalation, Every 4 hours PRN    amLODIPine (Norvasc) 10 mg tablet TAKE 1 TABLET DAILY (DOSE INCREASE)    ascorbic acid (VITAMIN C) 500 mg, Daily    aspirin 81 mg, Daily    cholecalciferol (VITAMIN D-3) 25 mcg, Daily    chromium picolinate 1,000 mcg tablet 1 tablet, Daily    clopidogrel (PLAVIX) 75 mg, oral, Daily    dofetilide (Tikosyn) 125 mcg capsule oral, Every 12 hours    econazole nitrate 1 % cream 1 Application, 2 times daily, Apply and gentaly massage into affected area(s) twice daily    Eliquis 5 mg, oral, 2 times daily    ezetimibe (ZETIA) 10 mg, oral, Daily    fluticasone (Flonase) 50 mcg/actuation nasal spray 1 spray, Daily    ketoconazole (NIZOral) 2 % cream USE AS DIRECTED TWICE DAILY AS NEEDED    Lactobacillus acidophilus (PROBIOTIC ORAL) 1 capsule, Daily    Lidocaine Pain Relief 4 % patch Apply 1 Patch as directed once daily for 5 days. Remove patch after 12 hours    lisinopril 40 mg tablet TAKE 1 TABLET DAILY (INCREASED DOSE)    magnesium oxide (MAG-OX) 800 mg, Daily    metFORMIN (Glucophage) 1,000 mg tablet     metoprolol succinate XL (TOPROL-XL) 100 mg, oral, Daily    nitroglycerin (NITROSTAT) 0.4 mg, sublingual, Every 5 min PRN    norethindrone (AYGESTIN) 5 mg, oral, Daily    Rybelsus 14 mg, oral, Daily    zinc gluconate 50 mg tablet 50 mg of elemental zinc, Daily        Recent Lab Results:  PT/INR:    Lab Results   Component Value Date    PROTIME 14.0 (H) 02/27/2024    INR 1.2 (H) 02/27/2024       CMP:    Lab Results   Component Value Date     11/15/2024    K 4.0 11/15/2024     11/15/2024    CO2 29 11/15/2024    BUN 13 11/15/2024    CREATININE 0.73 11/15/2024     GLUCOSE 145 (H) 11/15/2024    CALCIUM 9.6 11/15/2024       Magnesium:    Lab Results   Component Value Date    MG 2.23 08/14/2023       Hepatic Function Panel:    Lab Results   Component Value Date    ALKPHOS 65 11/15/2024    ALT 33 11/15/2024    AST 30 11/15/2024    PROT 7.3 11/15/2024    BILITOT 0.8 11/15/2024       TSH:    Lab Results   Component Value Date    TSH 0.95 11/15/2024       Patient Active Problem List   Diagnosis    Amaurosis fugax of right eye    Anxiety    BPPV (benign paroxysmal positional vertigo)    Dyslipidemia    High serum vitamin E    Hypertension    Hypertrophy of nasal turbinates    CAD (coronary artery disease)    Claudication (CMS-HCC)    Fatigue    Flexor tendinitis of hand    Nasal septal deviation    Oligomenorrhea    MAYELA on CPAP    Palpitations    PVC (premature ventricular contraction)    Vertigo    Type 2 diabetes mellitus    Transient visual loss of right eye    Stented coronary artery    SOBOE (shortness of breath on exertion)    Pain of right upper extremity    Basal cell carcinoma    Flexor carpi ulnaris tendinitis    [place] nodes    Hyperlipidemia    Hypertriglyceridemia    OA (osteoarthritis)    PCOS (polycystic ovarian syndrome)    Renal cyst    Tinea corporis    Vitamin deficiency    Elevated androgen levels    Sleep apnea    Dyspnea    Controlled type 2 diabetes mellitus without complication, without long-term current use of insulin (Multi)    Anticoagulant long-term use    Former smoker    Non-ischemic myocardial injury (non-traumatic)    Presence of stent in anterior descending branch of left coronary artery    Abnormal stress test    Intradermal nevus    Seborrheic keratoses    Paroxysmal atrial fibrillation (Multi)        ASSESSMENT AND PLAN  Problem List Items Addressed This Visit       MAYELA on CPAP - Primary     Spoke about switching Rybelsus to Mounjaro because Mounjaro is also indicated for sleep apnea.  Improvement in her sleep apnea can help keep her in out of  atrial fibrillation.         Paroxysmal atrial fibrillation (Multi)     Today she is in normal sinus rhythm with a PVC, rate 75 bpm, QTc 506 ms.  Review of her prior QTc intervals have been 516 - 506 ms last year.  We discussed that her interval has been stable but we will keep tabs on it.        Follow-up with Dr. Miranda in 6 months.    KLARISSA Briseno, PA-C

## 2025-02-20 NOTE — ASSESSMENT & PLAN NOTE
Spoke about switching Rybelsus to Mounjaro because Mounjaro was also indicated for sleep apnea.  Improvement in her sleep apnea can help keep her in out of atrial fibrillation.

## 2025-03-17 ENCOUNTER — PATIENT OUTREACH (OUTPATIENT)
Dept: CARE COORDINATION | Age: 55
End: 2025-03-17
Payer: COMMERCIAL

## 2025-03-20 ENCOUNTER — APPOINTMENT (OUTPATIENT)
Dept: CARDIOLOGY | Facility: CLINIC | Age: 55
End: 2025-03-20
Payer: COMMERCIAL

## 2025-04-04 ENCOUNTER — HOSPITAL ENCOUNTER (OUTPATIENT)
Dept: RADIOLOGY | Facility: CLINIC | Age: 55
Discharge: HOME | End: 2025-04-04
Payer: COMMERCIAL

## 2025-04-04 DIAGNOSIS — K76.0 FATTY LIVER: ICD-10-CM

## 2025-04-04 PROCEDURE — 76705 ECHO EXAM OF ABDOMEN: CPT | Performed by: RADIOLOGY

## 2025-04-04 PROCEDURE — 76705 ECHO EXAM OF ABDOMEN: CPT

## 2025-04-11 ENCOUNTER — HOSPITAL ENCOUNTER (OUTPATIENT)
Dept: RADIOLOGY | Facility: CLINIC | Age: 55
Discharge: HOME | End: 2025-04-11
Payer: COMMERCIAL

## 2025-04-11 DIAGNOSIS — M54.50 LOW BACK PAIN, UNSPECIFIED BACK PAIN LATERALITY, UNSPECIFIED CHRONICITY, UNSPECIFIED WHETHER SCIATICA PRESENT: ICD-10-CM

## 2025-04-11 PROCEDURE — 72100 X-RAY EXAM L-S SPINE 2/3 VWS: CPT

## 2025-04-11 PROCEDURE — 76770 US EXAM ABDO BACK WALL COMP: CPT

## 2025-04-14 ENCOUNTER — APPOINTMENT (OUTPATIENT)
Dept: CARDIOLOGY | Facility: CLINIC | Age: 55
End: 2025-04-14

## 2025-04-14 VITALS
SYSTOLIC BLOOD PRESSURE: 142 MMHG | BODY MASS INDEX: 43.58 KG/M2 | HEART RATE: 70 BPM | WEIGHT: 230.8 LBS | DIASTOLIC BLOOD PRESSURE: 92 MMHG | HEIGHT: 61 IN

## 2025-04-14 DIAGNOSIS — E78.5 DYSLIPIDEMIA: ICD-10-CM

## 2025-04-14 DIAGNOSIS — I10 PRIMARY HYPERTENSION: ICD-10-CM

## 2025-04-14 DIAGNOSIS — I25.10 CORONARY ARTERY DISEASE INVOLVING NATIVE HEART WITHOUT ANGINA PECTORIS, UNSPECIFIED VESSEL OR LESION TYPE: ICD-10-CM

## 2025-04-14 DIAGNOSIS — R94.39 ABNORMAL STRESS TEST: ICD-10-CM

## 2025-04-14 DIAGNOSIS — G47.33 OBSTRUCTIVE SLEEP APNEA SYNDROME: ICD-10-CM

## 2025-04-14 DIAGNOSIS — Z95.5 STENTED CORONARY ARTERY: ICD-10-CM

## 2025-04-14 DIAGNOSIS — E78.2 MIXED HYPERLIPIDEMIA: ICD-10-CM

## 2025-04-14 DIAGNOSIS — I48.0 PAROXYSMAL ATRIAL FIBRILLATION (MULTI): ICD-10-CM

## 2025-04-14 DIAGNOSIS — I5A NON-ISCHEMIC MYOCARDIAL INJURY (NON-TRAUMATIC): ICD-10-CM

## 2025-04-14 DIAGNOSIS — G47.33 OSA ON CPAP: ICD-10-CM

## 2025-04-14 DIAGNOSIS — E11.9 CONTROLLED TYPE 2 DIABETES MELLITUS WITHOUT COMPLICATION, WITHOUT LONG-TERM CURRENT USE OF INSULIN: ICD-10-CM

## 2025-04-14 DIAGNOSIS — R00.2 PALPITATIONS: ICD-10-CM

## 2025-04-14 DIAGNOSIS — Z95.5 PRESENCE OF STENT IN ANTERIOR DESCENDING BRANCH OF LEFT CORONARY ARTERY: ICD-10-CM

## 2025-04-14 DIAGNOSIS — Z87.891 FORMER SMOKER: ICD-10-CM

## 2025-04-14 PROCEDURE — 1036F TOBACCO NON-USER: CPT | Performed by: INTERNAL MEDICINE

## 2025-04-14 PROCEDURE — 3008F BODY MASS INDEX DOCD: CPT | Performed by: INTERNAL MEDICINE

## 2025-04-14 PROCEDURE — 99214 OFFICE O/P EST MOD 30 MIN: CPT | Performed by: INTERNAL MEDICINE

## 2025-04-14 PROCEDURE — 4010F ACE/ARB THERAPY RXD/TAKEN: CPT | Performed by: INTERNAL MEDICINE

## 2025-04-14 PROCEDURE — 3077F SYST BP >= 140 MM HG: CPT | Performed by: INTERNAL MEDICINE

## 2025-04-14 PROCEDURE — 3080F DIAST BP >= 90 MM HG: CPT | Performed by: INTERNAL MEDICINE

## 2025-04-14 RX ORDER — TIRZEPATIDE 2.5 MG/.5ML
0.5 INJECTION, SOLUTION SUBCUTANEOUS
COMMUNITY
Start: 2025-04-04

## 2025-04-14 NOTE — PATIENT INSTRUCTIONS
Continue same medications/treatment.  Patient educated on proper medication use.  Patient educated on risk factor modification.  Please bring any lab results from other providers/physicians to your next appointment.    Please bring all medicines, vitamins, and herbal supplements with you when you come to the office.    Prescriptions will not be filled unless you are compliant with your follow up appointments or have a follow up appointment scheduled as per instruction of your physician. Refills should be requested at the time of your visit.    Follow up in 6 months    I, NIKKI MORGAN RN, AM SCRIBING FOR AND IN THE PRESENCE OF DR. DREA REZA, DO, FACC

## 2025-04-14 NOTE — PROGRESS NOTES
Patient:  Turner Thakkar  YOB: 1970  MRN: [id number]     Chief complaint:   Chief Complaint   Patient presents with    Follow-up     6 month follow for ASHD and mild carotid disease management.        Chief Complaint/Active Symptoms:       Turner Thakkar is a 55 y.o. female who returns today for cardiac follow-up.  Patient is doing well.  She denies any chest pain shortness of breath or other clinical complaints.  Found 2+ years ago she had an LAD stent.  A year or so ago she did undergo a repeat cath close with abnormal Karrie scan but there was no significant obstruction.  She remains morbidly obese.  She has dyslipidemia hypertension chronic palpitations and paroxysmal A-fib for which she is followed by Dr. Miranda.  She is now on Mounjaro from her primary care doctor for weight loss and diabetes.  She denies angina.  She denies any excess dyspnea.      Objective:     Vitals:    04/14/25 1506   BP: (!) 142/92   Pulse: 70       Vitals:    04/14/25 1506   Weight: 105 kg (230 lb 12.8 oz)       Allergies:     Allergies   Allergen Reactions    Oxycodone-Acetaminophen Palpitations     palpitations          Medications:     Current Outpatient Medications   Medication Instructions    albuterol (Proventil HFA) 90 mcg/actuation inhaler 2 puffs, inhalation, Every 4 hours PRN    amLODIPine (Norvasc) 10 mg tablet TAKE 1 TABLET DAILY (DOSE INCREASE)    ascorbic acid (VITAMIN C) 500 mg, Daily    aspirin 81 mg, Daily    cholecalciferol (VITAMIN D-3) 25 mcg, Daily    chromium picolinate 1,000 mcg tablet 1 tablet, Daily    clopidogrel (PLAVIX) 75 mg, oral, Daily    dofetilide (Tikosyn) 125 mcg capsule oral, Every 12 hours    econazole nitrate 1 % cream 1 Application, 2 times daily, Apply and gentaly massage into affected area(s) twice daily    Eliquis 5 mg, oral, 2 times daily    ezetimibe (ZETIA) 10 mg, oral, Daily    fluticasone (Flonase) 50 mcg/actuation nasal spray 1 spray, Daily    ketoconazole (NIZOral) 2 % cream USE AS  DIRECTED TWICE DAILY AS NEEDED    Lactobacillus acidophilus (PROBIOTIC ORAL) 1 capsule, Daily    Lidocaine Pain Relief 4 % patch Apply 1 Patch as directed once daily for 5 days. Remove patch after 12 hours    lisinopril 40 mg tablet TAKE 1 TABLET DAILY (INCREASED DOSE)    magnesium oxide (MAG-OX) 800 mg, Daily    metFORMIN (Glucophage) 1,000 mg tablet     metoprolol succinate XL (TOPROL-XL) 100 mg, oral, Daily    Mounjaro 2.5 mg/0.5 mL pen injector 0.5 mL, Every 7 days    nitroglycerin (NITROSTAT) 0.4 mg, sublingual, Every 5 min PRN    norethindrone (AYGESTIN) 5 mg, oral, Daily    Rybelsus 14 mg, oral, Daily    zinc gluconate 50 mg tablet 50 mg of elemental zinc, Daily       Physical Examination:   Constitutional:       Appearance: Healthy appearance. Not in distress.   Neck:      Vascular: No JVR. JVD normal.   Pulmonary:      Effort: Pulmonary effort is normal.      Breath sounds: Normal breath sounds. No wheezing. No rhonchi. No rales.   Chest:      Chest wall: Not tender to palpatation.   Cardiovascular:      PMI at left midclavicular line. Normal rate. Regular rhythm. Normal S1. Normal S2.       Murmurs: There is no murmur.      No gallop.  No click. No rub.   Pulses:     Intact distal pulses.   Edema:     Peripheral edema absent.   Abdominal:      General: Bowel sounds are normal.      Palpations: Abdomen is soft.      Tenderness: There is no abdominal tenderness.   Musculoskeletal: Normal range of motion.         General: No tenderness. Skin:     General: Skin is warm and dry.   Neurological:      General: No focal deficit present.      Mental Status: Alert and oriented to person, place and time.            Lab:     CBC:   Lab Results   Component Value Date    WBC 7.8 11/15/2024    RBC 5.33 (H) 11/15/2024    HGB 16.0 11/15/2024    HCT 46.6 (H) 11/15/2024     11/15/2024        CMP:    Lab Results   Component Value Date     11/15/2024    K 4.0 11/15/2024     11/15/2024    CO2 29 11/15/2024     BUN 13 11/15/2024    CREATININE 0.73 11/15/2024    GLUCOSE 145 (H) 11/15/2024    CALCIUM 9.6 11/15/2024       Magnesium:    Lab Results   Component Value Date    MG 2.23 08/14/2023       Lipid Profile:    Lab Results   Component Value Date    TRIG 228 (H) 11/15/2024    HDL 39.0 11/15/2024    LDLCALC 156 (H) 11/15/2024       TSH:    Lab Results   Component Value Date    TSH 0.95 11/15/2024       BNP:   Lab Results   Component Value Date    BNP 18 08/14/2023        PT/INR:    Lab Results   Component Value Date    PROTIME 14.0 (H) 02/27/2024    INR 1.2 (H) 02/27/2024       HgBA1c:    Lab Results   Component Value Date    HGBA1C 7.9 (H) 03/05/2025       BMP:  Lab Results   Component Value Date     11/15/2024     06/06/2024     02/27/2024    K 4.0 11/15/2024    K 4.5 06/06/2024    K 4.2 02/27/2024     11/15/2024     06/06/2024     02/27/2024    CO2 29 11/15/2024    CO2 26 06/06/2024    CO2 24 02/27/2024    BUN 13 11/15/2024    BUN 10 06/06/2024    BUN 10 02/27/2024    CREATININE 0.73 11/15/2024    CREATININE 0.76 06/06/2024    CREATININE 0.81 02/27/2024       CBC:  Lab Results   Component Value Date    WBC 7.8 11/15/2024    WBC 6.9 02/27/2024    WBC 8.4 08/14/2023    WBC 7.0 02/05/2023    WBC 5.5 01/29/2023    RBC 5.33 (H) 11/15/2024    RBC 4.96 02/27/2024    RBC 4.94 08/14/2023    RBC 4.84 02/05/2023    RBC 5.06 01/29/2023    HGB 16.0 11/15/2024    HGB 15.4 02/27/2024    HGB 15.2 08/14/2023    HGB 14.7 02/05/2023    HGB 15.3 01/29/2023    HCT 46.6 (H) 11/15/2024    HCT 45.2 02/27/2024    HCT 44.7 08/14/2023    HCT 41.9 02/05/2023    HCT 43.9 01/29/2023    MCV 87 11/15/2024    MCV 91 02/27/2024    MCV 90 08/14/2023    MCV 87 02/05/2023    MCV 87 01/29/2023    MCH 30.0 11/15/2024    MCH 31.0 02/27/2024    MCHC 34.3 11/15/2024    MCHC 34.1 02/27/2024    MCHC 34.0 08/14/2023    MCHC 35.1 02/05/2023    MCHC 34.9 01/29/2023    RDW 12.2 11/15/2024    RDW 12.6 02/27/2024    RDW 12.8  08/14/2023    RDW 12.3 02/05/2023    RDW 12.3 01/29/2023     11/15/2024     02/27/2024     08/14/2023     02/05/2023     01/29/2023       Cardiac Enzymes:    Lab Results   Component Value Date    TROPHS CANCELED 08/14/2023    TROPHS 8 08/14/2023    TROPHS 7 08/14/2023       Hepatic Function Panel:    Lab Results   Component Value Date    ALKPHOS 65 11/15/2024    ALT 33 11/15/2024    AST 30 11/15/2024    PROT 7.3 11/15/2024    BILITOT 0.8 11/15/2024         Diagnostic Studies:     XR lumbar spine 2-3 views    Result Date: 4/12/2025  Interpreted By:  Daniele Prieto, STUDY: XR LUMBAR SPINE 2-3 VIEWS; ;  4/11/2025 9:03 am   INDICATION: Signs/Symptoms:pain.   ,M54.50 Low back pain, unspecified   COMPARISON: 10/16/2024.   ACCESSION NUMBER(S): [id number]   ORDERING CLINICIAN: J CARLOS MOTT   FINDINGS: Lumbar spine, three views   There is mild osteophytosis throughout the lumbar spine. There is no disc space narrowing. No fracture. No spondylolisthesis. Vascular calcifications present.         Mild multilevel spondylosis in the lumbar spine. No acute abnormality   MACRO: None   Signed by: Daniele Prieto 4/12/2025 8:37 AM Dictation workstation:   [id number]    US renal complete    Result Date: 4/12/2025  Interpreted By:  Sarbjit Girard, STUDY: US RENAL COMPLETE;  4/11/2025 8:43 am   INDICATION: Signs/Symptoms:pain.   COMPARISON: 07/05/2024   ACCESSION NUMBER(S): [id number]   ORDERING CLINICIAN: J CARLOS MOTT   TECHNIQUE: Multiple images of the kidneys were obtained.   FINDINGS: RIGHT KIDNEY: 11.5 cm in length. No hydronephrosis. Lower pole cyst measuring 1.8 cm.   LEFT KIDNEY: 11.1 cm in length. No hydronephrosis. Multiple cysts measuring 2.5 cm, 2.8 cm and 1.6 cm respectively.   BLADDER: Unremarkable for degree of distention. Bilateral ureteral jets identified.   Other: Increased hepatic echogenicity likely related to steatosis.       Bilateral renal cysts.   Signed by:  "Sarbjit Girard 4/12/2025 8:28 AM Dictation workstation:   [id number]      EKG:   No results found for: \"EKG\"  Cardiac Cath Post Procedure Notes:  Post Procedure Diagnosis: Double vessel disease.  Blood Loss:               Estimated blood loss during the procedure was 0 mls.  Specimens Removed:        Number of specimen(s) removed: none.     ____________________________________________________________________________________  CONCLUSIONS:   1. Left Main Coronary Artery: This artery is normal.   2. Left Anterior Descending Artery: presents luminal irregularities and contains patent previously placed stents.   3. Proximal LAD Lesion: The percent stenosis is 50%.   4. Circumflex Coronary Artery: presents luminal irregularities.   5. Mid CX Lesion: The percent stenosis is 50%.   6. Right Coronary Artery: presents luminal irregularities.   7. The Left Ventricular Ejection Fraction is 55%.     ICD 10 Codes:  Angina pectoris, unspecified-I20.9     CPT Codes:  Left Heart Cath (visualization of coronaries) and LV-30437; FFR, initial Vessel (PCI)-22366; Moderate Sedation Services 1st additional 15 minutes patient >5 years-64841; Moderate Sedation Services 2nd additional 15 minutes patient >5 years-39297     [id number] Carlito Adrian MD  Performing Physician  Electronically signed by [id number] Adrian MD on 2/29/2024 at 2:36:29 PM              ** Final **    Radiology:     No orders to display       Problem List:     Patient Active Problem List   Diagnosis    Amaurosis fugax of right eye    Anxiety    BPPV (benign paroxysmal positional vertigo)    Dyslipidemia    High serum vitamin E    Hypertension    Hypertrophy of nasal turbinates    CAD (coronary artery disease)    Claudication    Fatigue    Flexor tendinitis of hand    Nasal septal deviation    Oligomenorrhea    MAYELA on CPAP    Palpitations    PVC (premature ventricular contraction)    Vertigo    Type 2 diabetes mellitus    Transient visual loss of right eye    Stented coronary " artery    SOBOE (shortness of breath on exertion)    Pain of right upper extremity    Basal cell carcinoma    Flexor carpi ulnaris tendinitis    [place] nodes    Hyperlipidemia    Hypertriglyceridemia    OA (osteoarthritis)    PCOS (polycystic ovarian syndrome)    Renal cyst    Tinea corporis    Vitamin deficiency    Elevated androgen levels    Sleep apnea    Dyspnea    Controlled type 2 diabetes mellitus without complication, without long-term current use of insulin    Anticoagulant long-term use    Former smoker    Non-ischemic myocardial injury (non-traumatic)    Presence of stent in anterior descending branch of left coronary artery    Abnormal stress test    Intradermal nevus    Seborrheic keratoses    Paroxysmal atrial fibrillation (Multi)         ASSESSMENT   55-year-old female here for routine cardiovascular follow-up.    Meds, vitals, examination as noted.    Chart review details constipation at length.    Impression:    Problem List Items Addressed This Visit       Dyslipidemia    Hypertension    CAD (coronary artery disease)    MAYELA on CPAP    Palpitations    Stented coronary artery    Hyperlipidemia    Sleep apnea    Controlled type 2 diabetes mellitus without complication, without long-term current use of insulin    Former smoker    Non-ischemic myocardial injury (non-traumatic)    Presence of stent in anterior descending branch of left coronary artery    Abnormal stress test    Paroxysmal atrial fibrillation (Multi)       PLAN   Recommendation:  Maintain current meds  See me back in 6 months  Exercise and weight loss  Call if any problems or issues arise  Plan follow-up cardiac studies at the end of the year      Nancy BRANDT RN  am scribing for, and in the presence of Dr. Dylan Urrutia DO .    Nancy BRANDT RN , personally performed the services described in the documentation as scribed by Dr. Dylan Urrutia DO  in my presence, and confirm it is both accurate and complete.      Dylan Urrutia, DO  Thank you for allowing me to participate in the care of this patient. Please do not hesitate to contact me with any further questions or concerns.

## 2025-07-30 DIAGNOSIS — I10 PRIMARY HYPERTENSION: ICD-10-CM

## 2025-07-30 RX ORDER — LISINOPRIL 40 MG/1
40 TABLET ORAL DAILY
Qty: 90 TABLET | Refills: 3 | Status: SHIPPED | OUTPATIENT
Start: 2025-07-30 | End: 2026-07-30

## 2025-07-30 NOTE — TELEPHONE ENCOUNTER
Received request for prescription refills for patient.   Patient follows with Dr. Urrutia    Request is for lisinopril  Is patient currently on medication yes    Last OV 4/14/2025  Next OV 10/20/2025    Pended for signing and sent to provider

## 2025-08-27 ENCOUNTER — APPOINTMENT (OUTPATIENT)
Dept: CARDIOLOGY | Facility: CLINIC | Age: 55
End: 2025-08-27
Payer: COMMERCIAL

## 2025-08-27 VITALS
BODY MASS INDEX: 41.72 KG/M2 | DIASTOLIC BLOOD PRESSURE: 80 MMHG | HEART RATE: 72 BPM | WEIGHT: 221 LBS | HEIGHT: 61 IN | SYSTOLIC BLOOD PRESSURE: 120 MMHG

## 2025-08-27 DIAGNOSIS — Z79.01 ANTICOAGULATION MANAGEMENT ENCOUNTER: Primary | ICD-10-CM

## 2025-08-27 DIAGNOSIS — Z51.81 ANTICOAGULATION MANAGEMENT ENCOUNTER: Primary | ICD-10-CM

## 2025-08-27 DIAGNOSIS — I48.11 LONGSTANDING PERSISTENT ATRIAL FIBRILLATION (MULTI): ICD-10-CM

## 2025-08-27 DIAGNOSIS — I5A NON-ISCHEMIC MYOCARDIAL INJURY (NON-TRAUMATIC): ICD-10-CM

## 2025-08-27 DIAGNOSIS — R94.31 ABNORMAL EKG: ICD-10-CM

## 2025-08-27 DIAGNOSIS — E66.01 MORBID OBESITY WITH BODY MASS INDEX (BMI) OF 40.0 OR HIGHER (MULTI): ICD-10-CM

## 2025-08-27 DIAGNOSIS — Z87.891 FORMER SMOKER: ICD-10-CM

## 2025-08-27 DIAGNOSIS — I10 PRIMARY HYPERTENSION: ICD-10-CM

## 2025-08-27 DIAGNOSIS — R00.2 PALPITATIONS: ICD-10-CM

## 2025-08-27 PROCEDURE — 93000 ELECTROCARDIOGRAM COMPLETE: CPT | Performed by: INTERNAL MEDICINE

## 2025-08-27 PROCEDURE — 3008F BODY MASS INDEX DOCD: CPT | Performed by: INTERNAL MEDICINE

## 2025-08-27 PROCEDURE — 4010F ACE/ARB THERAPY RXD/TAKEN: CPT | Performed by: INTERNAL MEDICINE

## 2025-08-27 PROCEDURE — 99214 OFFICE O/P EST MOD 30 MIN: CPT | Performed by: INTERNAL MEDICINE

## 2025-08-27 PROCEDURE — 3074F SYST BP LT 130 MM HG: CPT | Performed by: INTERNAL MEDICINE

## 2025-08-27 PROCEDURE — 1036F TOBACCO NON-USER: CPT | Performed by: INTERNAL MEDICINE

## 2025-08-27 PROCEDURE — 3079F DIAST BP 80-89 MM HG: CPT | Performed by: INTERNAL MEDICINE

## 2025-08-27 ASSESSMENT — ENCOUNTER SYMPTOMS
PND: 0
WHEEZING: 0
SNORING: 0
SHORTNESS OF BREATH: 0
NEAR-SYNCOPE: 0
SYNCOPE: 0
ORTHOPNEA: 0
COUGH: 0
CLAUDICATION: 0
IRREGULAR HEARTBEAT: 0
CARDIOVASCULAR NEGATIVE: 1

## 2025-09-02 ENCOUNTER — TRANSCRIBE ORDERS (OUTPATIENT)
Dept: ADMINISTRATIVE | Age: 55
End: 2025-09-02

## 2025-09-02 DIAGNOSIS — Z12.31 ENCOUNTER FOR SCREENING MAMMOGRAM FOR MALIGNANT NEOPLASM OF BREAST: Primary | ICD-10-CM

## 2025-09-04 ENCOUNTER — HOSPITAL ENCOUNTER (OUTPATIENT)
Dept: WOMENS IMAGING | Age: 55
Discharge: HOME | End: 2025-09-06
Payer: COMMERCIAL

## 2025-09-04 DIAGNOSIS — Z12.31 ENCOUNTER FOR SCREENING MAMMOGRAM FOR MALIGNANT NEOPLASM OF BREAST: ICD-10-CM

## 2025-09-04 PROCEDURE — 77063 BREAST TOMOSYNTHESIS BI: CPT

## 2025-10-20 ENCOUNTER — APPOINTMENT (OUTPATIENT)
Dept: CARDIOLOGY | Facility: CLINIC | Age: 55
End: 2025-10-20
Payer: COMMERCIAL

## 2025-12-26 ENCOUNTER — APPOINTMENT (OUTPATIENT)
Dept: OBSTETRICS AND GYNECOLOGY | Facility: CLINIC | Age: 55
End: 2025-12-26
Payer: COMMERCIAL

## 2026-03-06 ENCOUNTER — APPOINTMENT (OUTPATIENT)
Dept: CARDIOLOGY | Facility: CLINIC | Age: 56
End: 2026-03-06
Payer: COMMERCIAL

## (undated) DEVICE — CATHETER, GUIDING, VISTA BRITE 6FR, XB LAD 3.5 100CM

## (undated) DEVICE — GUIDEWIRE, OMNIWIRE, 185CM, STRAIGHT TIP

## (undated) DEVICE — CATHETER, OPTITORQUE, 5FR, JACKY, 3.5/ 2H/110CM, CURVED

## (undated) DEVICE — TUBING, PRESSURE MONITIOR, 12IN/30CM

## (undated) DEVICE — TUBING, MANIFOLD, LOW PRESSURE

## (undated) DEVICE — CATHETER, DIAGNOSTIC, JUDKINS, LEFT, 5 FR-JL 3.5

## (undated) DEVICE — BAND, VASCULAR, RADIAL HEMOSTAT, REGULAR 24CM

## (undated) DEVICE — GUIDEWIRE, RUN THROUGH WIRE, 180CM

## (undated) DEVICE — CATHETER, GUIDING, VISTA BRITE 6FR, XB 3.0 100CM

## (undated) DEVICE — SHEATH, GLIDESHEATH, SLENDER, 6FR 10CM